# Patient Record
Sex: MALE | Race: BLACK OR AFRICAN AMERICAN | NOT HISPANIC OR LATINO
[De-identification: names, ages, dates, MRNs, and addresses within clinical notes are randomized per-mention and may not be internally consistent; named-entity substitution may affect disease eponyms.]

---

## 2017-01-16 ENCOUNTER — RESULT REVIEW (OUTPATIENT)
Age: 62
End: 2017-01-16

## 2017-01-16 ENCOUNTER — INPATIENT (INPATIENT)
Facility: HOSPITAL | Age: 62
LOS: 3 days | Discharge: HOME HEALTH | End: 2017-01-20
Attending: HOSPITALIST | Admitting: HOSPITALIST
Payer: MEDICAID

## 2017-01-16 VITALS
RESPIRATION RATE: 16 BRPM | TEMPERATURE: 98 F | HEIGHT: 68 IN | HEART RATE: 83 BPM | WEIGHT: 164.91 LBS | DIASTOLIC BLOOD PRESSURE: 88 MMHG | SYSTOLIC BLOOD PRESSURE: 133 MMHG | OXYGEN SATURATION: 100 %

## 2017-01-16 DIAGNOSIS — Z98.89 OTHER SPECIFIED POSTPROCEDURAL STATES: Chronic | ICD-10-CM

## 2017-01-16 DIAGNOSIS — Z90.49 ACQUIRED ABSENCE OF OTHER SPECIFIED PARTS OF DIGESTIVE TRACT: Chronic | ICD-10-CM

## 2017-01-16 DIAGNOSIS — Z90.89 ACQUIRED ABSENCE OF OTHER ORGANS: Chronic | ICD-10-CM

## 2017-01-16 NOTE — ED ADULT TRIAGE NOTE - CHIEF COMPLAINT QUOTE
Complaints of SOB, worse on excursion for about a week getting worse over past three days, pt states history of COPD, lung ca, with surgery in Oct. Saturday coughed up phlegm yesterday and today no coughing

## 2017-01-17 ENCOUNTER — RESULT REVIEW (OUTPATIENT)
Age: 62
End: 2017-01-17

## 2017-01-17 DIAGNOSIS — J90 PLEURAL EFFUSION, NOT ELSEWHERE CLASSIFIED: ICD-10-CM

## 2017-01-17 DIAGNOSIS — N40.0 BENIGN PROSTATIC HYPERPLASIA WITHOUT LOWER URINARY TRACT SYMPTOMS: ICD-10-CM

## 2017-01-17 DIAGNOSIS — Z41.8 ENCOUNTER FOR OTHER PROCEDURES FOR PURPOSES OTHER THAN REMEDYING HEALTH STATE: ICD-10-CM

## 2017-01-17 DIAGNOSIS — N18.3 CHRONIC KIDNEY DISEASE, STAGE 3 (MODERATE): ICD-10-CM

## 2017-01-17 DIAGNOSIS — I10 ESSENTIAL (PRIMARY) HYPERTENSION: ICD-10-CM

## 2017-01-17 DIAGNOSIS — C34.91 MALIGNANT NEOPLASM OF UNSPECIFIED PART OF RIGHT BRONCHUS OR LUNG: ICD-10-CM

## 2017-01-17 DIAGNOSIS — D64.9 ANEMIA, UNSPECIFIED: ICD-10-CM

## 2017-01-17 DIAGNOSIS — Z90.2 ACQUIRED ABSENCE OF LUNG [PART OF]: Chronic | ICD-10-CM

## 2017-01-17 LAB
ALBUMIN SERPL ELPH-MCNC: 3.6 G/DL — SIGNIFICANT CHANGE UP (ref 3.3–5)
ALP SERPL-CCNC: 88 U/L — SIGNIFICANT CHANGE UP (ref 40–120)
ALT FLD-CCNC: 16 U/L — SIGNIFICANT CHANGE UP (ref 4–41)
APTT BLD: 29.6 SEC — SIGNIFICANT CHANGE UP (ref 27.5–37.4)
AST SERPL-CCNC: 15 U/L — SIGNIFICANT CHANGE UP (ref 4–40)
BASE EXCESS BLDV CALC-SCNC: -0.3 MMOL/L — SIGNIFICANT CHANGE UP
BASOPHILS # BLD AUTO: 0.03 K/UL — SIGNIFICANT CHANGE UP (ref 0–0.2)
BASOPHILS NFR BLD AUTO: 0.3 % — SIGNIFICANT CHANGE UP (ref 0–2)
BILIRUB SERPL-MCNC: 0.6 MG/DL — SIGNIFICANT CHANGE UP (ref 0.2–1.2)
BLOOD GAS VENOUS - CREATININE: 2.25 MG/DL — HIGH (ref 0.5–1.3)
BUN SERPL-MCNC: 30 MG/DL — HIGH (ref 7–23)
CALCIUM SERPL-MCNC: 9.1 MG/DL — SIGNIFICANT CHANGE UP (ref 8.4–10.5)
CHLORIDE BLDV-SCNC: 102 MMOL/L — SIGNIFICANT CHANGE UP (ref 96–108)
CHLORIDE SERPL-SCNC: 97 MMOL/L — LOW (ref 98–107)
CO2 SERPL-SCNC: 21 MMOL/L — LOW (ref 22–31)
CREAT SERPL-MCNC: 2.08 MG/DL — HIGH (ref 0.5–1.3)
EOSINOPHIL # BLD AUTO: 0.08 K/UL — SIGNIFICANT CHANGE UP (ref 0–0.5)
EOSINOPHIL NFR BLD AUTO: 0.8 % — SIGNIFICANT CHANGE UP (ref 0–6)
GAS PNL BLDV: 134 MMOL/L — LOW (ref 136–146)
GLUCOSE BLDV-MCNC: 105 — HIGH (ref 70–99)
GLUCOSE SERPL-MCNC: 110 MG/DL — HIGH (ref 70–99)
HCO3 BLDV-SCNC: 23 MMOL/L — SIGNIFICANT CHANGE UP (ref 20–27)
HCT VFR BLD CALC: 34.7 % — LOW (ref 39–50)
HCT VFR BLDV CALC: 35.3 % — LOW (ref 39–51)
HGB BLD-MCNC: 11.2 G/DL — LOW (ref 13–17)
HGB BLDV-MCNC: 11.5 G/DL — LOW (ref 13–17)
IMM GRANULOCYTES NFR BLD AUTO: 0.2 % — SIGNIFICANT CHANGE UP (ref 0–1.5)
INR BLD: 1.17 — SIGNIFICANT CHANGE UP (ref 0.87–1.18)
LACTATE BLDV-MCNC: 1.3 MMOL/L — SIGNIFICANT CHANGE UP (ref 0.5–2)
LYMPHOCYTES # BLD AUTO: 2.07 K/UL — SIGNIFICANT CHANGE UP (ref 1–3.3)
LYMPHOCYTES # BLD AUTO: 20.8 % — SIGNIFICANT CHANGE UP (ref 13–44)
MAGNESIUM SERPL-MCNC: 2.4 MG/DL — SIGNIFICANT CHANGE UP (ref 1.6–2.6)
MCHC RBC-ENTMCNC: 29.3 PG — SIGNIFICANT CHANGE UP (ref 27–34)
MCHC RBC-ENTMCNC: 32.3 % — SIGNIFICANT CHANGE UP (ref 32–36)
MCV RBC AUTO: 90.8 FL — SIGNIFICANT CHANGE UP (ref 80–100)
MONOCYTES # BLD AUTO: 1.17 K/UL — HIGH (ref 0–0.9)
MONOCYTES NFR BLD AUTO: 11.8 % — SIGNIFICANT CHANGE UP (ref 2–14)
NEUTROPHILS # BLD AUTO: 6.56 K/UL — SIGNIFICANT CHANGE UP (ref 1.8–7.4)
NEUTROPHILS NFR BLD AUTO: 66.1 % — SIGNIFICANT CHANGE UP (ref 43–77)
PCO2 BLDV: 46 MMHG — SIGNIFICANT CHANGE UP (ref 41–51)
PH BLDV: 7.35 PH — SIGNIFICANT CHANGE UP (ref 7.32–7.43)
PHOSPHATE SERPL-MCNC: 3.8 MG/DL — SIGNIFICANT CHANGE UP (ref 2.5–4.5)
PLATELET # BLD AUTO: 320 K/UL — SIGNIFICANT CHANGE UP (ref 150–400)
PMV BLD: 10.6 FL — SIGNIFICANT CHANGE UP (ref 7–13)
PO2 BLDV: 31 MMHG — LOW (ref 35–40)
POTASSIUM BLDV-SCNC: 4.4 MMOL/L — SIGNIFICANT CHANGE UP (ref 3.4–4.5)
POTASSIUM SERPL-MCNC: 4.9 MMOL/L — SIGNIFICANT CHANGE UP (ref 3.5–5.3)
POTASSIUM SERPL-SCNC: 4.9 MMOL/L — SIGNIFICANT CHANGE UP (ref 3.5–5.3)
PROT SERPL-MCNC: 8 G/DL — SIGNIFICANT CHANGE UP (ref 6–8.3)
PROTHROM AB SERPL-ACNC: 13.4 SEC — HIGH (ref 10–13.1)
RBC # BLD: 3.82 M/UL — LOW (ref 4.2–5.8)
RBC # FLD: 13.3 % — SIGNIFICANT CHANGE UP (ref 10.3–14.5)
SAO2 % BLDV: 48 % — LOW (ref 60–85)
SODIUM SERPL-SCNC: 136 MMOL/L — SIGNIFICANT CHANGE UP (ref 135–145)
WBC # BLD: 9.93 K/UL — SIGNIFICANT CHANGE UP (ref 3.8–10.5)
WBC # FLD AUTO: 9.93 K/UL — SIGNIFICANT CHANGE UP (ref 3.8–10.5)

## 2017-01-17 PROCEDURE — 99223 1ST HOSP IP/OBS HIGH 75: CPT | Mod: GC

## 2017-01-17 PROCEDURE — 88363 XM ARCHIVE TISSUE MOLEC ANAL: CPT

## 2017-01-17 PROCEDURE — 71250 CT THORAX DX C-: CPT | Mod: 26

## 2017-01-17 PROCEDURE — 71020: CPT | Mod: 26

## 2017-01-17 RX ORDER — OXYCODONE HYDROCHLORIDE 5 MG/1
5 TABLET ORAL ONCE
Qty: 0 | Refills: 0 | Status: DISCONTINUED | OUTPATIENT
Start: 2017-01-17 | End: 2017-01-17

## 2017-01-17 RX ORDER — IPRATROPIUM/ALBUTEROL SULFATE 18-103MCG
3 AEROSOL WITH ADAPTER (GRAM) INHALATION ONCE
Qty: 0 | Refills: 0 | Status: COMPLETED | OUTPATIENT
Start: 2017-01-17 | End: 2017-01-17

## 2017-01-17 RX ORDER — AMLODIPINE BESYLATE 2.5 MG/1
5 TABLET ORAL DAILY
Qty: 0 | Refills: 0 | Status: DISCONTINUED | OUTPATIENT
Start: 2017-01-17 | End: 2017-01-20

## 2017-01-17 RX ORDER — TAMSULOSIN HYDROCHLORIDE 0.4 MG/1
0.4 CAPSULE ORAL AT BEDTIME
Qty: 0 | Refills: 0 | Status: DISCONTINUED | OUTPATIENT
Start: 2017-01-17 | End: 2017-01-20

## 2017-01-17 RX ORDER — FINASTERIDE 5 MG/1
5 TABLET, FILM COATED ORAL DAILY
Qty: 0 | Refills: 0 | Status: DISCONTINUED | OUTPATIENT
Start: 2017-01-17 | End: 2017-01-20

## 2017-01-17 RX ORDER — FOLIC ACID 0.8 MG
1 TABLET ORAL DAILY
Qty: 0 | Refills: 0 | Status: DISCONTINUED | OUTPATIENT
Start: 2017-01-17 | End: 2017-01-20

## 2017-01-17 RX ORDER — IPRATROPIUM/ALBUTEROL SULFATE 18-103MCG
3 AEROSOL WITH ADAPTER (GRAM) INHALATION EVERY 6 HOURS
Qty: 0 | Refills: 0 | Status: DISCONTINUED | OUTPATIENT
Start: 2017-01-17 | End: 2017-01-20

## 2017-01-17 RX ORDER — NICOTINE POLACRILEX 2 MG
1 GUM BUCCAL DAILY
Qty: 0 | Refills: 0 | Status: DISCONTINUED | OUTPATIENT
Start: 2017-01-17 | End: 2017-01-20

## 2017-01-17 RX ADMIN — Medication 1 MILLIGRAM(S): at 13:03

## 2017-01-17 RX ADMIN — OXYCODONE HYDROCHLORIDE 5 MILLIGRAM(S): 5 TABLET ORAL at 22:14

## 2017-01-17 RX ADMIN — Medication 3 MILLILITER(S): at 01:39

## 2017-01-17 RX ADMIN — AMLODIPINE BESYLATE 5 MILLIGRAM(S): 2.5 TABLET ORAL at 15:25

## 2017-01-17 RX ADMIN — OXYCODONE HYDROCHLORIDE 5 MILLIGRAM(S): 5 TABLET ORAL at 23:14

## 2017-01-17 RX ADMIN — Medication 1 PATCH: at 15:25

## 2017-01-17 RX ADMIN — TAMSULOSIN HYDROCHLORIDE 0.4 MILLIGRAM(S): 0.4 CAPSULE ORAL at 22:14

## 2017-01-17 RX ADMIN — FINASTERIDE 5 MILLIGRAM(S): 5 TABLET, FILM COATED ORAL at 13:03

## 2017-01-17 NOTE — H&P ADULT. - HISTORY OF PRESENT ILLNESS
62 Y/O male with positive finding on routine chest x ray at annual physical Jan. 2016.  Biopsy May 2016 confirmed malignancy.  Completed course of  chemo ( June2016- September 2016).  Scheduled Right VATS with possible thoracotomy, lobectomy on 10/25/2016  Post op course uneventful except for elevated Creatine. Renal consult, Dr Hartley saw pt and recommended Increase IVF for 10hrs and a renal sono. On POD # 2 renal sono done and was found to be normal and serum Creatine trending toward baseline 61M h/o HTN, CKD3, BPH, lung adenocarcinoma (dx 5/2016) s/p chemotherapy (5-9/2016) and R VATS and R apical lobectomy (10/25/16) presents with acute dyspnea x 3 days in the context of progressive dyspnea for about 3 weeks. Pt came to the ED because his shortness of breath became unbearable over the last 3 days. The SOB is accompanied by chest pain radiating to the back on the right side worse with movement, and new dry cough for 1 week. Pt denies any fever, hemoptysis, weight loss, changes in appetite, fatigue, or malaise.     Pt says that he never really felt short of breath before 3 weeks ago and could walk about a mile in early December. He said that his lung cancer presented with an incidental nodule on chest x ray 1/2016 and was never really symptomatic. Biopsy May 2016 confirmed malignancy. He received underwent chemotherapy and VATS and had no treatment since the procedure as he had a lapse in his insurance. He is set to resume chemotherapy with his oncologist Dr. Lee next month along with radiation. 61M h/o HTN, CKD3, BPH, lung adenocarcinoma (dx 5/2016) s/p chemotherapy (5-9/2016) and R VATS and R apical lobectomy (10/25/16) presents with acute dyspnea x 3 days in the context of progressive dyspnea for about 3 weeks. Pt came to the ED because his shortness of breath became unbearable over the last 3 days. The SOB is accompanied by a new dry cough for 1 week. Pt has R sided chest wall pain present since the VATS procedure, worse when pending down, no orthopnea. Pt denies any fever, hemoptysis, weight loss, changes in appetite, fatigue, or malaise.     Pt says that he never really felt short of breath before 3 weeks ago and could walk about a mile in early December. He said that his lung cancer presented with an incidental nodule on chest x ray 1/2016 and was never really symptomatic. Biopsy May 2016 confirmed malignancy. He received underwent chemotherapy and VATS and had no treatment since the procedure as he had a lapse in his insurance. He is set to resume chemotherapy with his oncologist Dr. Lee next month along with radiation.    In the ED pt's vital signs were T 97.8, HR 83, /88, RR 18, 100% on room air. He received duoneb x1. CXR revealed a large right pleural effusion. 61M h/o HTN, CKD3, BPH, lung adenocarcinoma (dx 5/2016) s/p chemotherapy (5-9/2016) and R VATS and R apical lobectomy (10/25/16) presents with acute dyspnea x 3 days in the context of progressive dyspnea for about 3 weeks. Pt came to the ED because his shortness of breath became unbearable over the last 3 days. The SOB is accompanied by a new dry cough for 1 week. Pt has R sided chest wall pain present since the VATS procedure, worse when pending down, no orthopnea. Pt denies any fever, hemoptysis, weight loss, changes in appetite, fatigue, or malaise.     Pt says that he never really felt short of breath before 3 weeks ago and could walk about a mile in early December. He said that his lung cancer presented with an incidental nodule on chest x ray 1/2016 and was never really symptomatic. Biopsy May 2016 confirmed malignancy. He received underwent chemotherapy and VATS and had no treatment since the procedure as he had a lapse in his insurance. He is set to resume chemotherapy with his oncologist Dr. Lee next month along with radiation.    In the ED pt's vital signs were T 97.8, HR 83, /88, RR 18, 100% on room air. Labs showed baseline normocytic anemia to Hgb 11.2 and baseline Cr 2.08. CXR revealed a large right pleural effusion. CT chest showed a large loculated right pleural effusion with near complete atelectasis of the right middle and lower lobes. He received duoneb x1.

## 2017-01-17 NOTE — ED PROVIDER NOTE - DIMINISHED BREATH SOUNDS
LEFT UPPER LOBE/decreased air entry of the left lung with dullness to percussion/LEFT LOWER LOBE RIGHT LOWER LOBE/RIGHT MIDDLE LOBE/decreased air entry of the left lung with dullness to percussion/RIGHT UPPER LOBE RIGHT MIDDLE LOBE/decreased air entry of the right lung with dullness to percussion/RIGHT UPPER LOBE/RIGHT LOWER LOBE

## 2017-01-17 NOTE — ED ADULT NURSE NOTE - OBJECTIVE STATEMENT
pt alert and oriented, pt states hx of lung CA, and dx with COPD. pt states about a week ago he began to feel SOB with any exertion including standing up. pt states a few days ago it began to get even worse. pt denies pain.

## 2017-01-17 NOTE — H&P ADULT. - ASSESSMENT
61M h/o HTN, CKD3, BPH, lung adenocarcinoma (dx 5/2016) s/p chemotherapy (5-9/2016) and R VATS and R apical lobectomy (10/25/16) presents with acute dyspnea x 3 days in the context of progressive dyspnea for about 3 weeks. Pt with normal vitals, clinically stable with baseline labs but CT chest showing large loculated right pleural effusion. Concern for malignant effusion in context of known lung cancer.

## 2017-01-17 NOTE — H&P ADULT. - NEGATIVE GASTROINTESTINAL SYMPTOMS
no vomiting/no melena/no diarrhea/no constipation/no hematochezia/no change in bowel habits/no abdominal pain/no nausea

## 2017-01-17 NOTE — H&P ADULT. - PROBLEM SELECTOR PLAN 4
Likely anemia of chronic disease as Hgb at baseline, but will monitor for bleeding and hemoptysis  - Iron studies  - Trend Hgb

## 2017-01-17 NOTE — H&P ADULT. - PSH
H/O eye surgery  left eye 1992  H/O hernia repair  2006  History of appendectomy  1976  History of lung biopsy  (R) 5/2016  History of tonsillectomy  1975  S/P lobectomy of lung
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
## 2017-01-17 NOTE — H&P ADULT. - ATTENDING COMMENTS
Pt was seen and examined on date of admission, 1/17/17 with housestaff.  Case d/w Dr. Whiting and agree with findings and plan as detailed above.

## 2017-01-17 NOTE — H&P ADULT. - LYMPHATIC
axillary L/axillary R/anterior cervical L/supraclavicular R/posterior cervical L/anterior cervical R/supraclavicular L/posterior cervical R

## 2017-01-17 NOTE — ED PROVIDER NOTE - CHPI ED SYMPTOMS POS
DIFFICULTY BREATHING/COUGH/SHORTNESS OF BREATH/resolved coughing (Saturday), b/l foot edema, and "right sided pain." and has worsening (progressively), constant SOB

## 2017-01-17 NOTE — ED PROVIDER NOTE - OBJECTIVE STATEMENT
62 y/o M pt with PMHx of Lung Cancer, blind, BPH, and HTN and PSHx of tumor removal ("right side"), and lung biopsy, arrives to the ED c/o progressively worsening (last 3 days) constant SOB onset x1 week. Relief with "calming down." Dx with lunger cancer in May 2016, and had Chemotherapy done for 3 months; also had a tumor removal. Pt is supposed to have Chemotherapy and radiation treatment in February.  Also c/o resolved coughing (Saturday), b/l foot edema, and "right sided pain." Denies fever, chills, or any other complaints. NKDA. 60 y/o M pt with PMHx of Lung Cancer (diagnosed in May 2016, s/p VATS with right lobectomy in October 2016), blind in left eye, current smoker,  BPH, and HTN and PSHx of tumor removal ("right side"), and lung biopsy, arrives to the ED c/o progressively worsening (last 3 days) constant SOB onset x1 week. Relief with "calming down." Dx with lunger cancer in May 2016, and had Chemotherapy done for 3 months; also had a tumor removal. Pt is supposed to have Chemotherapy and radiation treatment in February.  Also c/o resolved coughing (Saturday), b/l foot edema, and "right sided pain." Denies fever, chills, or any other complaints. NKDA. 60 y/o M pt with PMHx of Lung Cancer (diagnosed in May 2016, s/p VATS with right lobectomy in October 2016), blind in left eye, current smoker,  BPH, and HTN and PSHx of tumor removal ("right side"), and lung biopsy, arrives to the ED c/o progressively worsening (last 3 days) constant SOB onset x1 week. Relief with "calming down." Dx with lunger cancer in May 2016, and had Chemotherapy done for 3 months; also had a tumor removal. Pt is supposed to have Chemotherapy and radiation treatment in February.  Also c/o resolved coughing (Saturday), b/l foot edema, and "right sided pain." Denies fever, chills, or any other complaints. NKDA.  Of note, pt has 47 year pack history and quit smoking initially in October but resumed again on NYE and has been smoking 1 PPD since then. 62 y/o M pt with PMHx of Lung Cancer (diagnosed in May 2016, s/p VATS with right lobectomy in October 2016), blind in left eye, current smoker,  BPH, and HTN and PSHx of tumor removal ("right side"), and lung biopsy, arrives to the ED c/o progressively worsening (last 3 days) GOINS onset x 1 week. Relief with rest. No CP. Dx with lung cancer in May 2016, and had Chemotherapy done for 3 months; also had a tumor removal. Pt is supposed to have Chemotherapy and radiation treatment in February.  Also c/o resolved coughing (Saturday), b/l foot edema, and "right sided pain." Denies fever, chills, or any other complaints. NKDA.  Of note, pt has 47 year pack history and quit smoking initially in October but resumed again on NYE and has been smoking 1 PPD since then.  Chemo has been delayed d/t insurance gap. Now being re-established.

## 2017-01-17 NOTE — H&P ADULT. - PROBLEM SELECTOR PLAN 1
Large pleural effusions on imaging, but pt currently has normal vitals without hypoxia  - Concern for malignant effusion given known lung cancer  - Will call CT surgery for thoracentesis  - L lung currently compensating, will monitor vitals and O2 sat  - Stephan MATUTEN

## 2017-01-17 NOTE — ED PROVIDER NOTE - NS ED MD SCRIBE ATTENDING SCRIBE SECTIONS
HISTORY OF PRESENT ILLNESS/DISPOSITION/VITAL SIGNS( Pullset)/REVIEW OF SYSTEMS/HIV/PHYSICAL EXAM/PAST MEDICAL/SURGICAL/SOCIAL HISTORY

## 2017-01-17 NOTE — H&P ADULT. - RS GEN PE MLT RESP DETAILS PC
good air movement/diminished breath sounds, R/chest wall tenderness on R/no wheezes/no rales/chest wall tenderness/no rhonchi/respirations non-labored chest wall tenderness/no wheezes/no rales/no rhonchi/diminished breath sounds, R/decreased breath sound at right base/good air movement/respirations non-labored

## 2017-01-17 NOTE — H&P ADULT. - FAMILY HISTORY
<<-----Click on this checkbox to enter Family History Family history of heart disease, PPM, CHF     Father  Still living? Unknown  Family history of heart disease, Age at diagnosis: Age Unknown

## 2017-01-17 NOTE — ED PROVIDER NOTE - CARE PLAN
Principal Discharge DX:	Pleural effusion, right  Secondary Diagnosis:	Malignant neoplasm of lung, unspecified laterality, unspecified part of lung

## 2017-01-17 NOTE — H&P ADULT. - PROBLEM SELECTOR PLAN 3
Currently stable, at baseline  - Renal ultrasound to rule out obstruction as pt has urinary hesitancy  - Avoid nephrotoxic agents, trend Cr  - Renal following

## 2017-01-17 NOTE — ED PROVIDER NOTE - CONSTITUTIONAL, MLM
normal... Well appearing, well nourished, awake, alert, oriented to person, place, time/situation and in no apparent distress. Well appearing, well nourished, awake, alert, oriented to person, place, time/situation and in no apparent distress. Breathign comfortably at rest, speaking in full sentences.

## 2017-01-17 NOTE — H&P ADULT. - RADIOLOGY RESULTS AND INTERPRETATION
CT chest: Large loculated right pleural effusion with near complete atelectasis of   the right middle and lower lobes. Status post right upper lobectomy.

## 2017-01-17 NOTE — ED PROVIDER NOTE - PSH
H/O eye surgery  left eye 1992  H/O hernia repair  2006  History of appendectomy  1976  History of lung biopsy  (R) 5/2016  History of tonsillectomy  1975

## 2017-01-17 NOTE — ED PROVIDER NOTE - MEDICAL DECISION MAKING DETAILS
62 y/o M pt (with Lung cancer) presents with worsening SOB. consider plural effusion vs worsening cancer vs PE. labs, chest XR, nebs., and reassess

## 2017-01-17 NOTE — ED PROVIDER NOTE - ATTENDING CONTRIBUTION TO CARE
Attending Attestation: Dr. Kaplan  I have personally performed a history and physical examination of the patient and discussed management with the resident as well as the patient.  I reviewed the resident's note and agree with the documented findings and plan of care.  I have authored and modified critical sections of the Provider Note, including but not limited to HPI, Physical Exam and MDM. 60 y/o M pt (with Lung cancer) presents with worsening SOB. consider plural effusion vs worsening cancer vs PE. labs, chest XR, nebs., and reassess    This scribe's documentation has been prepared under my direction and personally reviewed by me in its entirety. I confirm that the note above accurately reflects all work, treatment, procedures, and medical decision making performed by me.

## 2017-01-18 LAB
ALBUMIN SERPL ELPH-MCNC: 3.4 G/DL — SIGNIFICANT CHANGE UP (ref 3.3–5)
ALP SERPL-CCNC: 91 U/L — SIGNIFICANT CHANGE UP (ref 40–120)
ALT FLD-CCNC: 9 U/L — SIGNIFICANT CHANGE UP (ref 4–41)
APTT BLD: 28.4 SEC — SIGNIFICANT CHANGE UP (ref 27.5–37.4)
AST SERPL-CCNC: 26 U/L — SIGNIFICANT CHANGE UP (ref 4–40)
BILIRUB DIRECT SERPL-MCNC: 0.1 MG/DL — SIGNIFICANT CHANGE UP (ref 0.1–0.2)
BILIRUB SERPL-MCNC: 0.4 MG/DL — SIGNIFICANT CHANGE UP (ref 0.2–1.2)
BUN SERPL-MCNC: 27 MG/DL — HIGH (ref 7–23)
CALCIUM SERPL-MCNC: 8.9 MG/DL — SIGNIFICANT CHANGE UP (ref 8.4–10.5)
CHLORIDE SERPL-SCNC: 98 MMOL/L — SIGNIFICANT CHANGE UP (ref 98–107)
CO2 SERPL-SCNC: 18 MMOL/L — LOW (ref 22–31)
CREAT SERPL-MCNC: 1.82 MG/DL — HIGH (ref 0.5–1.3)
FERRITIN SERPL-MCNC: 580.3 NG/ML — HIGH (ref 30–400)
GLUCOSE FLD-MCNC: 14 MG/DL — SIGNIFICANT CHANGE UP
GLUCOSE SERPL-MCNC: 91 MG/DL — SIGNIFICANT CHANGE UP (ref 70–99)
GRAM STN FLD: SIGNIFICANT CHANGE UP
HCT VFR BLD CALC: 33.6 % — LOW (ref 39–50)
HGB BLD-MCNC: 11 G/DL — LOW (ref 13–17)
INR BLD: 1.04 — SIGNIFICANT CHANGE UP (ref 0.87–1.18)
IRON SATN MFR SERPL: 217 UG/DL — SIGNIFICANT CHANGE UP (ref 155–535)
IRON SATN MFR SERPL: 25 UG/DL — LOW (ref 45–165)
LDH SERPL L TO P-CCNC: 496 U/L — SIGNIFICANT CHANGE UP
LDH SERPL L TO P-CCNC: 546 U/L — HIGH (ref 135–225)
MAGNESIUM SERPL-MCNC: 2.4 MG/DL — SIGNIFICANT CHANGE UP (ref 1.6–2.6)
MCHC RBC-ENTMCNC: 29.1 PG — SIGNIFICANT CHANGE UP (ref 27–34)
MCHC RBC-ENTMCNC: 32.7 % — SIGNIFICANT CHANGE UP (ref 32–36)
MCV RBC AUTO: 88.9 FL — SIGNIFICANT CHANGE UP (ref 80–100)
PHOSPHATE SERPL-MCNC: 3.5 MG/DL — SIGNIFICANT CHANGE UP (ref 2.5–4.5)
PLATELET # BLD AUTO: 323 K/UL — SIGNIFICANT CHANGE UP (ref 150–400)
PMV BLD: 10.6 FL — SIGNIFICANT CHANGE UP (ref 7–13)
POTASSIUM SERPL-MCNC: 4.5 MMOL/L — SIGNIFICANT CHANGE UP (ref 3.5–5.3)
POTASSIUM SERPL-SCNC: 4.5 MMOL/L — SIGNIFICANT CHANGE UP (ref 3.5–5.3)
PROT FLD-MCNC: 5.7 G/DL — SIGNIFICANT CHANGE UP
PROT SERPL-MCNC: 7.9 G/DL — SIGNIFICANT CHANGE UP (ref 6–8.3)
PROTHROM AB SERPL-ACNC: 11.9 SEC — SIGNIFICANT CHANGE UP (ref 10–13.1)
RBC # BLD: 3.78 M/UL — LOW (ref 4.2–5.8)
RBC # FLD: 13.6 % — SIGNIFICANT CHANGE UP (ref 10.3–14.5)
SODIUM SERPL-SCNC: 133 MMOL/L — LOW (ref 135–145)
SPECIMEN SOURCE: SIGNIFICANT CHANGE UP
UIBC SERPL-MCNC: 192 UG/DL — SIGNIFICANT CHANGE UP (ref 110–370)
WBC # BLD: 9.18 K/UL — SIGNIFICANT CHANGE UP (ref 3.8–10.5)
WBC # FLD AUTO: 9.18 K/UL — SIGNIFICANT CHANGE UP (ref 3.8–10.5)

## 2017-01-18 PROCEDURE — 99233 SBSQ HOSP IP/OBS HIGH 50: CPT | Mod: GC

## 2017-01-18 PROCEDURE — 71010: CPT | Mod: 26

## 2017-01-18 PROCEDURE — 88305 TISSUE EXAM BY PATHOLOGIST: CPT | Mod: 26

## 2017-01-18 PROCEDURE — 32551 INSERTION OF CHEST TUBE: CPT | Mod: 78

## 2017-01-18 PROCEDURE — 88112 CYTOPATH CELL ENHANCE TECH: CPT | Mod: 26

## 2017-01-18 RX ORDER — SENNA PLUS 8.6 MG/1
2 TABLET ORAL AT BEDTIME
Qty: 0 | Refills: 0 | Status: DISCONTINUED | OUTPATIENT
Start: 2017-01-18 | End: 2017-01-20

## 2017-01-18 RX ORDER — LIDOCAINE HCL 20 MG/ML
20 VIAL (ML) INJECTION ONCE
Qty: 0 | Refills: 0 | Status: DISCONTINUED | OUTPATIENT
Start: 2017-01-18 | End: 2017-01-20

## 2017-01-18 RX ORDER — ACETAMINOPHEN 500 MG
650 TABLET ORAL EVERY 6 HOURS
Qty: 0 | Refills: 0 | Status: DISCONTINUED | OUTPATIENT
Start: 2017-01-18 | End: 2017-01-20

## 2017-01-18 RX ORDER — DOCUSATE SODIUM 100 MG
100 CAPSULE ORAL THREE TIMES A DAY
Qty: 0 | Refills: 0 | Status: DISCONTINUED | OUTPATIENT
Start: 2017-01-18 | End: 2017-01-20

## 2017-01-18 RX ADMIN — Medication 1 MILLIGRAM(S): at 11:19

## 2017-01-18 RX ADMIN — TAMSULOSIN HYDROCHLORIDE 0.4 MILLIGRAM(S): 0.4 CAPSULE ORAL at 21:45

## 2017-01-18 RX ADMIN — Medication 650 MILLIGRAM(S): at 11:19

## 2017-01-18 RX ADMIN — Medication 100 MILLIGRAM(S): at 22:18

## 2017-01-18 RX ADMIN — AMLODIPINE BESYLATE 5 MILLIGRAM(S): 2.5 TABLET ORAL at 05:43

## 2017-01-18 RX ADMIN — FINASTERIDE 5 MILLIGRAM(S): 5 TABLET, FILM COATED ORAL at 11:19

## 2017-01-18 RX ADMIN — Medication 650 MILLIGRAM(S): at 11:49

## 2017-01-18 RX ADMIN — SENNA PLUS 2 TABLET(S): 8.6 TABLET ORAL at 22:18

## 2017-01-18 RX ADMIN — Medication 1 PATCH: at 11:19

## 2017-01-18 RX ADMIN — Medication 650 MILLIGRAM(S): at 22:15

## 2017-01-18 RX ADMIN — Medication 650 MILLIGRAM(S): at 21:45

## 2017-01-18 RX ADMIN — Medication 1 PATCH: at 11:20

## 2017-01-19 ENCOUNTER — RESULT REVIEW (OUTPATIENT)
Age: 62
End: 2017-01-19

## 2017-01-19 LAB
BUN SERPL-MCNC: 30 MG/DL — HIGH (ref 7–23)
CALCIUM SERPL-MCNC: 9.2 MG/DL — SIGNIFICANT CHANGE UP (ref 8.4–10.5)
CHLORIDE SERPL-SCNC: 98 MMOL/L — SIGNIFICANT CHANGE UP (ref 98–107)
CO2 SERPL-SCNC: 20 MMOL/L — LOW (ref 22–31)
CREAT SERPL-MCNC: 1.97 MG/DL — HIGH (ref 0.5–1.3)
GLUCOSE SERPL-MCNC: 108 MG/DL — HIGH (ref 70–99)
HCT VFR BLD CALC: 32.3 % — LOW (ref 39–50)
HGB BLD-MCNC: 10.7 G/DL — LOW (ref 13–17)
MAGNESIUM SERPL-MCNC: 2.4 MG/DL — SIGNIFICANT CHANGE UP (ref 1.6–2.6)
MCHC RBC-ENTMCNC: 29.2 PG — SIGNIFICANT CHANGE UP (ref 27–34)
MCHC RBC-ENTMCNC: 33.1 % — SIGNIFICANT CHANGE UP (ref 32–36)
MCV RBC AUTO: 88 FL — SIGNIFICANT CHANGE UP (ref 80–100)
NON-GYNECOLOGICAL CYTOLOGY STUDY: SIGNIFICANT CHANGE UP
PLATELET # BLD AUTO: 372 K/UL — SIGNIFICANT CHANGE UP (ref 150–400)
PMV BLD: 10 FL — SIGNIFICANT CHANGE UP (ref 7–13)
POTASSIUM SERPL-MCNC: 4.7 MMOL/L — SIGNIFICANT CHANGE UP (ref 3.5–5.3)
POTASSIUM SERPL-SCNC: 4.7 MMOL/L — SIGNIFICANT CHANGE UP (ref 3.5–5.3)
RBC # BLD: 3.67 M/UL — LOW (ref 4.2–5.8)
RBC # FLD: 13.3 % — SIGNIFICANT CHANGE UP (ref 10.3–14.5)
SODIUM SERPL-SCNC: 133 MMOL/L — LOW (ref 135–145)
WBC # BLD: 10.4 K/UL — SIGNIFICANT CHANGE UP (ref 3.8–10.5)
WBC # FLD AUTO: 10.4 K/UL — SIGNIFICANT CHANGE UP (ref 3.8–10.5)

## 2017-01-19 PROCEDURE — 71010: CPT | Mod: 26

## 2017-01-19 PROCEDURE — 99233 SBSQ HOSP IP/OBS HIGH 50: CPT | Mod: GC

## 2017-01-19 PROCEDURE — 71250 CT THORAX DX C-: CPT | Mod: 26

## 2017-01-19 RX ADMIN — Medication 1 PATCH: at 11:55

## 2017-01-19 RX ADMIN — AMLODIPINE BESYLATE 5 MILLIGRAM(S): 2.5 TABLET ORAL at 11:53

## 2017-01-19 RX ADMIN — Medication 100 MILLIGRAM(S): at 06:18

## 2017-01-19 RX ADMIN — SENNA PLUS 2 TABLET(S): 8.6 TABLET ORAL at 21:58

## 2017-01-19 RX ADMIN — Medication 1 MILLIGRAM(S): at 11:53

## 2017-01-19 RX ADMIN — Medication 100 MILLIGRAM(S): at 11:52

## 2017-01-19 RX ADMIN — TAMSULOSIN HYDROCHLORIDE 0.4 MILLIGRAM(S): 0.4 CAPSULE ORAL at 21:58

## 2017-01-19 RX ADMIN — Medication 1 PATCH: at 11:52

## 2017-01-19 RX ADMIN — FINASTERIDE 5 MILLIGRAM(S): 5 TABLET, FILM COATED ORAL at 11:53

## 2017-01-19 RX ADMIN — Medication 100 MILLIGRAM(S): at 21:58

## 2017-01-20 ENCOUNTER — TRANSCRIPTION ENCOUNTER (OUTPATIENT)
Age: 62
End: 2017-01-20

## 2017-01-20 ENCOUNTER — APPOINTMENT (OUTPATIENT)
Dept: THORACIC SURGERY | Facility: HOSPITAL | Age: 62
End: 2017-01-20

## 2017-01-20 VITALS
SYSTOLIC BLOOD PRESSURE: 105 MMHG | RESPIRATION RATE: 18 BRPM | DIASTOLIC BLOOD PRESSURE: 76 MMHG | TEMPERATURE: 98 F | HEART RATE: 66 BPM

## 2017-01-20 LAB
APTT BLD: 30.4 SEC — SIGNIFICANT CHANGE UP (ref 27.5–37.4)
BLD GP AB SCN SERPL QL: NEGATIVE — SIGNIFICANT CHANGE UP
BUN SERPL-MCNC: 28 MG/DL — HIGH (ref 7–23)
CALCIUM SERPL-MCNC: 9.4 MG/DL — SIGNIFICANT CHANGE UP (ref 8.4–10.5)
CHLORIDE SERPL-SCNC: 94 MMOL/L — LOW (ref 98–107)
CO2 SERPL-SCNC: 24 MMOL/L — SIGNIFICANT CHANGE UP (ref 22–31)
CREAT SERPL-MCNC: 2.03 MG/DL — HIGH (ref 0.5–1.3)
GLUCOSE SERPL-MCNC: 84 MG/DL — SIGNIFICANT CHANGE UP (ref 70–99)
HCT VFR BLD CALC: 33.4 % — LOW (ref 39–50)
HGB BLD-MCNC: 11.1 G/DL — LOW (ref 13–17)
INR BLD: 1.09 — SIGNIFICANT CHANGE UP (ref 0.87–1.18)
MCHC RBC-ENTMCNC: 29.6 PG — SIGNIFICANT CHANGE UP (ref 27–34)
MCHC RBC-ENTMCNC: 33.2 % — SIGNIFICANT CHANGE UP (ref 32–36)
MCV RBC AUTO: 89.1 FL — SIGNIFICANT CHANGE UP (ref 80–100)
PH FLD: 8 PH — SIGNIFICANT CHANGE UP
PLATELET # BLD AUTO: 408 K/UL — HIGH (ref 150–400)
PMV BLD: 10 FL — SIGNIFICANT CHANGE UP (ref 7–13)
POTASSIUM SERPL-MCNC: 4.7 MMOL/L — SIGNIFICANT CHANGE UP (ref 3.5–5.3)
POTASSIUM SERPL-SCNC: 4.7 MMOL/L — SIGNIFICANT CHANGE UP (ref 3.5–5.3)
PROTHROM AB SERPL-ACNC: 12.4 SEC — SIGNIFICANT CHANGE UP (ref 10–13.1)
RBC # BLD: 3.75 M/UL — LOW (ref 4.2–5.8)
RBC # FLD: 13.5 % — SIGNIFICANT CHANGE UP (ref 10.3–14.5)
RH IG SCN BLD-IMP: POSITIVE — SIGNIFICANT CHANGE UP
SODIUM SERPL-SCNC: 133 MMOL/L — LOW (ref 135–145)
WBC # BLD: 8.9 K/UL — SIGNIFICANT CHANGE UP (ref 3.8–10.5)
WBC # FLD AUTO: 8.9 K/UL — SIGNIFICANT CHANGE UP (ref 3.8–10.5)

## 2017-01-20 PROCEDURE — 88305 TISSUE EXAM BY PATHOLOGIST: CPT | Mod: 26

## 2017-01-20 PROCEDURE — 71010: CPT | Mod: 26,76

## 2017-01-20 PROCEDURE — 99239 HOSP IP/OBS DSCHRG MGMT >30: CPT

## 2017-01-20 RX ORDER — SODIUM CHLORIDE 9 MG/ML
1000 INJECTION INTRAMUSCULAR; INTRAVENOUS; SUBCUTANEOUS
Qty: 0 | Refills: 0 | Status: DISCONTINUED | OUTPATIENT
Start: 2017-01-20 | End: 2017-01-20

## 2017-01-20 RX ORDER — HEPARIN SODIUM 5000 [USP'U]/ML
5000 INJECTION INTRAVENOUS; SUBCUTANEOUS EVERY 12 HOURS
Qty: 0 | Refills: 0 | Status: DISCONTINUED | OUTPATIENT
Start: 2017-01-20 | End: 2017-01-20

## 2017-01-20 RX ORDER — NICOTINE POLACRILEX 2 MG
1 GUM BUCCAL
Qty: 1 | Refills: 2 | OUTPATIENT
Start: 2017-01-20 | End: 2017-04-19

## 2017-01-20 RX ORDER — OXYCODONE HYDROCHLORIDE 5 MG/1
1 TABLET ORAL
Qty: 84 | Refills: 0 | OUTPATIENT
Start: 2017-01-20 | End: 2017-02-10

## 2017-01-20 RX ADMIN — Medication 1 PATCH: at 17:00

## 2017-01-20 RX ADMIN — AMLODIPINE BESYLATE 5 MILLIGRAM(S): 2.5 TABLET ORAL at 05:52

## 2017-01-20 RX ADMIN — SODIUM CHLORIDE 30 MILLILITER(S): 9 INJECTION INTRAMUSCULAR; INTRAVENOUS; SUBCUTANEOUS at 14:00

## 2017-01-20 RX ADMIN — Medication 100 MILLIGRAM(S): at 13:59

## 2017-01-20 RX ADMIN — Medication 100 MILLIGRAM(S): at 05:52

## 2017-01-20 NOTE — DISCHARGE NOTE ADULT - PLAN OF CARE
Resolution The progressive shortness of breath that brought you to the hospital was caused by a fluid in the lung caused by your lung cancer. You had a chest tube called a PleurX catheter placed to drain your pleural effusion (lung fluid). The visiting nurse will come 3 times weekly to drain the catheter. Please call the number below on the next business day to schedule an appointment in 2 weeks with your cardiothoracic surgeon, Dr. De La Torre. Please see your primary care doctor within 1 week and have him send you to get a PA and Lateral Chest X Ray 2 days before the appointment to that you can bring it to your appointment with Dr. De La Torre. Please follow up with your oncologist Dr. Lee for treatment of your lung cancer with radiation and chemotherapy Please follow up with your nephrologist or primary care doctor. Please continue taking finasteride and flomax and follow up with your primary care doctor. Please continue taking amlodipine and follow up with your primary care doctor.

## 2017-01-20 NOTE — DISCHARGE NOTE ADULT - PATIENT PORTAL LINK FT
“You can access the [facility] Patient Portal, offered by [facility], by registering with the following website: [email or web address]”

## 2017-01-20 NOTE — DISCHARGE NOTE ADULT - SECONDARY DIAGNOSIS.
Adenocarcinoma of right lung CKD (chronic kidney disease), stage 3 (moderate) BPH (benign prostatic hyperplasia) HTN (hypertension)

## 2017-01-20 NOTE — DISCHARGE NOTE ADULT - CARE PLAN
Principal Discharge DX:	Malignant pleural effusion  Goal:	Resolution  Instructions for follow-up, activity and diet:	The progressive shortness of breath that brought you to the hospital was caused by a fluid in the lung caused by your lung cancer. You had a chest tube called a PleurX catheter placed to drain your pleural effusion (lung fluid). The visiting nurse will come 3 times weekly to drain the catheter. Please call the number below on the next business day to schedule an appointment in 2 weeks with your cardiothoracic surgeon, Dr. De La Torre. Please see your primary care doctor within 1 week and have him send you to get a PA and Lateral Chest X Ray 2 days before the appointment to that you can bring it to your appointment with Dr. De La Torre.  Secondary Diagnosis:	Adenocarcinoma of right lung  Instructions for follow-up, activity and diet:	Please follow up with your oncologist Dr. Lee for treatment of your lung cancer with radiation and chemotherapy  Secondary Diagnosis:	CKD (chronic kidney disease), stage 3 (moderate)  Instructions for follow-up, activity and diet:	Please follow up with your nephrologist or primary care doctor.  Secondary Diagnosis:	BPH (benign prostatic hyperplasia)  Instructions for follow-up, activity and diet:	Please continue taking finasteride and flomax and follow up with your primary care doctor.  Secondary Diagnosis:	HTN (hypertension)  Instructions for follow-up, activity and diet:	Please continue taking amlodipine and follow up with your primary care doctor.

## 2017-01-20 NOTE — DISCHARGE NOTE ADULT - FINDINGS/TREATMENT
You had a chest tube called a PleurX catheter placed to drain your pleural effusion (lung fluid). The visiting nurse will come 3 times weekly to drain the catheter. Please call the number below on the next business day to schedule an appointment in 2 weeks with your cardiothoracic surgeon, Dr. De La Torre. Please see your primary care doctor within 1 week and have him send you to get a PA and Lateral Chest X Ray 2 days before the appointment to that you can bring it to your appointment with Dr. De La Torre. You had a chest tube called a PleurX catheter placed to drain your pleural effusion (lung fluid). The visiting nurse will come 3 times weekly, Monday/Wednesday/Friday to no more than 500cc from the catheter each session. Please call the number below on the next business day to schedule an appointment in 2 weeks with your cardiothoracic surgeon, Dr. De La Torre. Please see your primary care doctor within 1 week and have him send you to get a PA and Lateral Chest X Ray 2 days before the appointment to that you can bring it to your appointment with Dr. De La Torre. You had a chest tube called a PleurX catheter placed to drain your pleural effusion (lung fluid). The visiting nurse will come 3 times weekly, Monday/Wednesday/Friday to no more than 500cc from the catheter each session. Please call the number below on the next business day to schedule an appointment in 2 weeks with your cardiothoracic surgeon, Dr. De La Torre. Please obtain a chest x-ray (PA and lateral) and bring the results CD to your appointment with Dr. De La Torre as well as have it faxed to Dr. De La Torre at [phone].

## 2017-01-20 NOTE — DISCHARGE NOTE ADULT - ADDITIONAL INSTRUCTIONS
Please call the number below on the next business day to schedule an appointment in 2 weeks with your cardiothoracic surgeon, Dr. De La Torre. Please see your primary care doctor within 1 week and have him send you to get a PA and Lateral Chest X Ray 2 days before the appointment so that you can bring it to your appointment with Dr. De La Torre. Please follow up with your oncologist, Dr. Lee within 1-2 weeks. Please call the number below on the next business day to schedule an appointment in 2 weeks with your cardiothoracic surgeon, Dr. De La Torre. Please obtain a chest x-ray (PA and lateral) and bring the results CD to your appointment with Dr. De La Torre as well as have it faxed to Dr. De La Torre at [phone]. You were provided with a prescription for this x-ray. Please see your primary care doctor within 1 week a. Please follow up with your oncologist, Dr. Lee within 1-2 weeks. Please call the number below on the next business day to schedule an appointment in 2 weeks with your cardiothoracic surgeon, Dr. De La Torre. Please obtain a chest x-ray (PA and lateral) and bring the results CD to your appointment with Dr. De La Torre as well as have it faxed to Dr. De La Torre at [phone]. You were provided with a prescription for this x-ray. Please see your primary care doctor within 1 week. Please follow up with your oncologist, Dr. Lee within 1-2 weeks.

## 2017-01-20 NOTE — DISCHARGE NOTE ADULT - MEDICATION SUMMARY - MEDICATIONS TO TAKE
I will START or STAY ON the medications listed below when I get home from the hospital:    finasteride 5 mg oral tablet  -- 1 tab(s) by mouth once a day  -- Indication: For BPH (benign prostatic hyperplasia)    Aspirin Enteric Coated 81 mg oral delayed release tablet  -- 1 tab(s) by mouth once a day  -- Indication: For Prevention of cardiovascular disease    oxyCODONE 5 mg oral capsule  -- 1 cap(s) by mouth every 6 hours, As Needed for severe pain MDD:4 tabs  -- Caution federal law prohibits the transfer of this drug to any person other  than the person for whom it was prescribed.  It is very important that you take or use this exactly as directed.  Do not skip doses or discontinue unless directed by your doctor.  May cause drowsiness.  Alcohol may intensify this effect.  Use care when operating dangerous machinery.  This prescription cannot be refilled.  Using more of this medication than prescribed may cause serious breathing problems.    -- Indication: For  Pain due to chest tube placement    tamsulosin 0.4 mg oral capsule  -- 1 cap(s) by mouth once a day  -- Indication: For BPH (benign prostatic hyperplasia)    amLODIPine 5 mg oral tablet  -- 1 tab(s) by mouth once a day  -- Indication: For HTN (hypertension)    nicotine 14 mg/24 hr transdermal film, extended release  -- 1 application by transdermal patch once a day  -- Indication: For Smoking cessation    folic acid 1 mg oral tablet  -- 1 tab(s) by mouth once a day  -- Indication: For vitamin

## 2017-01-20 NOTE — DISCHARGE NOTE ADULT - PROVIDER TOKENS
TOKEN:'[id number]',FREE:[LAST:[.],PHONE:[(   )    -],FAX:[(   )    -],ADDRESS:[Dr. John Feliz  Internal Medicine  [address] [place] [address]  [phone]]],TOKEN:'[id number]'

## 2017-01-20 NOTE — DISCHARGE NOTE ADULT - HOSPITAL COURSE
61M h/o HTN, CKD3, BPH, lung adenocarcinoma (dx 5/2016) s/p chemotherapy (5-9/2016) and R VATS and apical lobectomy (10/25/16) presents with acute dyspnea x 3 days in the context of progressive dyspnea for about 3 weeks. Pt came to the ED because his shortness of breath became unbearable over the last 3 days. The SOB is accompanied by a new dry cough for 1 week. Pt has R sided chest wall pain present since the VATS procedure, worse when pending down, no orthopnea. Pt denies any fever, hemoptysis, weight loss, changes in appetite, fatigue, or malaise. Pt says that he never really felt short of breath before 3 weeks ago and could walk about a mile in early December. He said that his lung cancer presented with an incidental nodule on chest x ray 1/2016 and was never really symptomatic. Biopsy May 2016 confirmed malignancy. He received underwent chemotherapy and VATS and had no treatment since the procedure as he had a lapse in his insurance. He is set to resume chemotherapy with his oncologist Dr. Lee next month along with radiation.In the ED pt's vital signs were T 97.8, HR 83, /88, RR 18, 100% on room air. Labs showed baseline normocytic anemia to Hgb 11.2 and baseline Cr 2.08. CXR revealed a large right pleural effusion. CT chest showed a large loculated right pleural effusion with near complete atelectasis of the right middle and lower lobes. He received duonebs x1.    Hospital Course:   Pt was admitted to the medicine service. Cardiothoracic surgery was consulted and placed a pigtail catheter to drain pt's malignant pleural effusion. Cytopathology of a fluid sample was sent and confirmed adenocarcinoma. Repeat CT chest showed interval drainage of a large loculated right pleural effusion, small right hydropneumothorax, and interval partial reexpansion of the right middle and lower lobes, mildly enlarged right paratracheal lymph node unchanged. Pt's pigtail catheter drained about 2.7 L since its placement 1/18. On 1/20, CT surgery placed a PleurX Catheter. Repeat chest x ray showed that the pt's pneumothorax had slighly decreased in size. Case management arranged a visiting nurse to drain the catheter three times weekly. Pt remained clinically stable with normal vitals and no hypoxia throughout his hospitalization. He is currently safe for discharge home and should follow up with CT surgeon Dr. De La Torre in 1-2 and a prescription was provided as requested by CT surgery for CXR PA and lateral to be completed 1-2 days prior to pt's appointment and should be brought to the appointment. Pt should also follow up with his primary care doctor within 1 week and with his oncologist Dr. Lee within 1-2 weeks. Pt was given oxycodone for control of severe pain associated with the chest tube. 61M h/o HTN, CKD3, BPH, lung adenocarcinoma (dx 5/2016) s/p chemotherapy (5-9/2016) and R VATS and apical lobectomy (10/25/16) presents with acute dyspnea x 3 days in the context of progressive dyspnea for about 3 weeks. Pt came to the ED because his shortness of breath became unbearable over the last 3 days. The SOB is accompanied by a new dry cough for 1 week. Pt has R sided chest wall pain present since the VATS procedure, worse when pending down, no orthopnea. Pt denies any fever, hemoptysis, weight loss, changes in appetite, fatigue, or malaise. Pt says that he never really felt short of breath before 3 weeks ago and could walk about a mile in early December. He said that his lung cancer presented with an incidental nodule on chest x ray 1/2016 and was never really symptomatic. Biopsy May 2016 confirmed malignancy. He received underwent chemotherapy and VATS and had no treatment since the procedure as he had a lapse in his insurance. He is set to resume chemotherapy with his oncologist Dr. Lee next month along with radiation.In the ED pt's vital signs were T 97.8, HR 83, /88, RR 18, 100% on room air. Labs showed baseline normocytic anemia to Hgb 11.2 and baseline Cr 2.08. CXR revealed a large right pleural effusion. CT chest showed a large loculated right pleural effusion with near complete atelectasis of the right middle and lower lobes. He received duonebs x1.    Hospital Course:   Pt was admitted to the medicine service. Cardiothoracic surgery was consulted and placed a pigtail catheter to drain pt's malignant pleural effusion. Cytopathology of a fluid sample was sent and confirmed adenocarcinoma. Repeat CT chest showed interval drainage of a large loculated right pleural effusion, small right hydropneumothorax, and interval partial reexpansion of the right middle and lower lobes, mildly enlarged right paratracheal lymph node unchanged. Pt's pigtail catheter drained about 2.7 L since its placement 1/18. On 1/20, CT surgery placed a PleurX Catheter. Repeat chest x ray showed that the pt's pneumothorax had slightly decreased in size. Case management arranged a visiting nurse to drain the catheter three times weekly. Pt remained clinically stable with normal vitals and no hypoxia throughout his hospitalization. He is currently safe for discharge home and should follow up with CT surgeon Dr. De La Torre in 1-2 and a prescription was provided as requested by CT surgery for CXR PA and lateral to be completed 1-2 days prior to pt's appointment. The results should be faxed to Dr De La Torre at [phone] and the CD should be brought to the appointment. Pt should also follow up with his primary care doctor within 1 week and with his oncologist Dr. Lee within 1-2 weeks. Pt was given oxycodone for control of severe pain associated with the chest tube. 61M h/o HTN, CKD3, BPH, lung adenocarcinoma (dx 5/2016) s/p chemotherapy (5-9/2016) and R VATS and apical lobectomy (10/25/16) presents with acute dyspnea x 3 days in the context of progressive dyspnea for about 3 weeks. Pt came to the ED because his shortness of breath became unbearable over the last 3 days. The SOB is accompanied by a new dry cough for 1 week. Pt has R sided chest wall pain present since the VATS procedure, worse when pending down, no orthopnea. Pt denies any fever, hemoptysis, weight loss, changes in appetite, fatigue, or malaise. Pt says that he never really felt short of breath before 3 weeks ago and could walk about a mile in early December. He said that his lung cancer presented with an incidental nodule on chest x ray 1/2016 and was never really symptomatic. Biopsy May 2016 confirmed malignancy. He received underwent chemotherapy and VATS and had no treatment since the procedure as he had a lapse in his insurance. He is set to resume chemotherapy with his oncologist Dr. Lee next month along with radiation.In the ED pt's vital signs were T 97.8, HR 83, /88, RR 18, 100% on room air. Labs showed baseline normocytic anemia to Hgb 11.2 and baseline Cr 2.08. CXR revealed a large right pleural effusion. CT chest showed a large loculated right pleural effusion with near complete atelectasis of the right middle and lower lobes. He received duonebs x1.    Hospital Course:   Pt was admitted to the medicine service. Cardiothoracic surgery was consulted and placed a pigtail catheter to drain pt's malignant pleural effusion. Cytopathology of a fluid sample was sent and confirmed adenocarcinoma. Repeat CT chest showed interval drainage of a large loculated right pleural effusion, small right hydropneumothorax, and interval partial reexpansion of the right middle and lower lobes, mildly enlarged right paratracheal lymph node unchanged. Pt's pigtail catheter drained about 2.7 L since its placement 1/18. On 1/20, CT surgery placed a PleurX Catheter. Repeat chest x ray showed that the pt's pneumothorax had slightly decreased in size. Case management arranged a visiting nurse to drain the catheter three times weekly. Pt remained clinically stable with normal vitals and no hypoxia throughout his hospitalization. He is currently safe for discharge home and should follow up with CT surgeon Dr. De La Torre in 1-2 weeks and a prescription was provided as requested by CT surgery for CXR PA and lateral to be completed 1-2 days prior to pt's appointment. The results should be faxed to Dr De La Torre at [phone] and the CD should be brought to the appointment. Pt should also follow up with his primary care doctor within 1 week and with his oncologist Dr. Lee within 1-2 weeks. Pt was given oxycodone for control of severe pain associated with the chest tube. Pt was counseled on smoking cessation and sent home on a nicotene patch. 61M h/o HTN, CKD3, BPH, lung adenocarcinoma (dx 5/2016) s/p chemotherapy (5-9/2016) and R VATS and apical lobectomy (10/25/16) presents with acute dyspnea x 3 days in the context of progressive dyspnea for about 3 weeks. Pt came to the ED because his shortness of breath became unbearable over the last 3 days. The SOB is accompanied by a new dry cough for 1 week. Pt has R sided chest wall pain present since the VATS procedure, worse when pending down, no orthopnea. Pt denies any fever, hemoptysis, weight loss, changes in appetite, fatigue, or malaise. Pt says that he never really felt short of breath before 3 weeks ago and could walk about a mile in early December. He said that his lung cancer presented with an incidental nodule on chest x ray 1/2016 and was never really symptomatic. Biopsy May 2016 confirmed malignancy. He received underwent chemotherapy and VATS and had no treatment since the procedure as he had a lapse in his insurance. He is set to resume chemotherapy with his oncologist Dr. Lee next month along with radiation.In the ED pt's vital signs were T 97.8, HR 83, /88, RR 18, 100% on room air. Labs showed baseline normocytic anemia to Hgb 11.2 and baseline Cr 2.08. CXR revealed a large right pleural effusion. CT chest showed a large loculated right pleural effusion with near complete atelectasis of the right middle and lower lobes. He received duonebs x1.    Hospital Course:   Pt was admitted to the medicine service. Cardiothoracic surgery was consulted and placed a pigtail catheter to drain pt's malignant pleural effusion. Cytopathology of a fluid sample was sent and confirmed adenocarcinoma. Repeat CT chest showed interval drainage of a large loculated right pleural effusion, small right hydropneumothorax, and interval partial reexpansion of the right middle and lower lobes, mildly enlarged right paratracheal lymph node unchanged. Pt's pigtail catheter drained about 2.7 L since its placement 1/18. On 1/20, CT surgery placed a PleurX Catheter. Repeat chest x ray showed that the pt's pneumothorax had slightly decreased in size. Case management arranged a visiting nurse to drain the catheter three times weekly. Pt remained clinically stable with normal vitals and no hypoxia throughout his hospitalization.     He is currently safe for discharge home and should follow up with CT surgeon Dr. De La Torre in 1-2 weeks and a prescription was provided as requested by CT surgery for CXR PA and lateral to be completed 1-2 days prior to pt's appointment. The results should be faxed to Dr De La Torre at [phone] and the CD should be brought to the appointment. Pt should also follow up with his primary care doctor within 1 week and with his oncologist Dr. Lee within 1-2 weeks. Pt was given oxycodone for control of severe pain associated with the chest tube. Pt was counseled on smoking cessation and sent home on a nicotine patch.

## 2017-01-20 NOTE — DISCHARGE NOTE ADULT - CARE PROVIDER_API CALL
Paul Lee), Internal Medicine  [address] [address] [place] [address]  Phone: [phone]  Fax: [phone]    .,   Dr. John Feliz  Internal Medicine  [address] [place] [address]  [phone]  Phone: (   )    -  Fax: (   )    -    Salvatore De La Torre), Surgery; Thoracic Surgery  [address] [place] [address]  Phone: [phone]  Fax: [phone]

## 2017-01-20 NOTE — DISCHARGE NOTE ADULT - CARE PROVIDERS DIRECT ADDRESSES
,DirectAddress_Unknown,DirectAddress_Unknown,[email or web address],DirectAddress_Unknown

## 2017-01-20 NOTE — DISCHARGE NOTE ADULT - HOME CARE AGENCY
[facility] [phone].  Nurse to visit on the day after discharge.  Other appropriate services to be arranged thereafter.   [facility] [phone]  Nurse to visit on the day after discharge.  Other appropriate services to be arranged thereafter. [facility] [phone].  Nurse to visit on the day after discharge.  Other appropriate services to be arranged thereafter

## 2017-01-23 LAB — BACTERIA FLD CULT: SIGNIFICANT CHANGE UP

## 2017-01-24 LAB
SPECIMEN SOURCE: SIGNIFICANT CHANGE UP
SURGICAL PATHOLOGY STUDY: SIGNIFICANT CHANGE UP

## 2017-02-01 LAB — SURGICAL PATHOLOGY STUDY: SIGNIFICANT CHANGE UP

## 2017-02-06 ENCOUNTER — APPOINTMENT (OUTPATIENT)
Dept: THORACIC SURGERY | Facility: CLINIC | Age: 62
End: 2017-02-06

## 2017-02-06 VITALS
DIASTOLIC BLOOD PRESSURE: 70 MMHG | OXYGEN SATURATION: 98 % | HEART RATE: 60 BPM | RESPIRATION RATE: 16 BRPM | HEIGHT: 68 IN | SYSTOLIC BLOOD PRESSURE: 110 MMHG | BODY MASS INDEX: 25.01 KG/M2 | WEIGHT: 165 LBS

## 2017-02-06 DIAGNOSIS — J90 PLEURAL EFFUSION, NOT ELSEWHERE CLASSIFIED: ICD-10-CM

## 2017-02-15 LAB — FUNGUS SPEC QL CULT: SIGNIFICANT CHANGE UP

## 2017-03-06 ENCOUNTER — OTHER (OUTPATIENT)
Age: 62
End: 2017-03-06

## 2017-03-20 ENCOUNTER — APPOINTMENT (OUTPATIENT)
Dept: THORACIC SURGERY | Facility: CLINIC | Age: 62
End: 2017-03-20

## 2017-03-20 VITALS
HEIGHT: 68 IN | BODY MASS INDEX: 24.1 KG/M2 | WEIGHT: 159 LBS | OXYGEN SATURATION: 95 % | HEART RATE: 69 BPM | SYSTOLIC BLOOD PRESSURE: 145 MMHG | RESPIRATION RATE: 16 BRPM | DIASTOLIC BLOOD PRESSURE: 80 MMHG

## 2017-03-28 ENCOUNTER — FORM ENCOUNTER (OUTPATIENT)
Age: 62
End: 2017-03-28

## 2017-03-28 NOTE — H&P PST ADULT - LYMPHATIC
anterior cervical L/axillary L/posterior cervical L/supraclavicular R/posterior cervical R/anterior cervical R/axillary R/supraclavicular L

## 2017-03-28 NOTE — H&P PST ADULT - HISTORY OF PRESENT ILLNESS
61M h/o HTN, CKD3, BPH, lung adenocarcinoma (dx 5/2016) s/p chemotherapy (5-9/2016) and R VATS and R apical lobectomy (10/25/16) presents with acute dyspnea x 3 days in the context of progressive dyspnea for about 3 weeks. Pt came to the ED because his shortness of breath became unbearable over the last 3 days. The SOB is accompanied by a new dry cough for 1 week. Pt has R sided chest wall pain present since the VATS procedure, worse when pending down, no orthopnea. Pt denies any fever, hemoptysis, weight loss, changes in appetite, fatigue, or malaise.     Pt says that he never really felt short of breath before 3 weeks ago and could walk about a mile in early December. He said that his lung cancer presented with an incidental nodule on chest x ray 1/2016 and was never really symptomatic. Biopsy May 2016 confirmed malignancy. He received underwent chemotherapy and VATS and had no treatment since the procedure as he had a lapse in his insurance. He is set to resume chemotherapy with his oncologist Dr. Lee next month along with radiation.    In the ED pt's vital signs were T 97.8, HR 83, /88, RR 18, 100% on room air. Labs showed baseline normocytic anemia to Hgb 11.2 and baseline Cr 2.08. CXR revealed a large right pleural effusion. CT chest showed a large loculated right pleural effusion with near complete atelectasis of the right middle and lower lobes. He received duoneb x1.   Patient underwent  Right video-assisted thoracoscopic surgery, pleural biopsy, placement of Pleurx catheter on 01/20/2017.  Today presents for Right PleurX catheter removal.

## 2017-03-28 NOTE — H&P PST ADULT - RS GEN PE MLT RESP DETAILS PC
diminished breath sounds, R/no rales/Right PleurX catheter in place/good air movement/respirations non-labored/no wheezes/no rhonchi/chest wall tenderness

## 2017-03-29 ENCOUNTER — OUTPATIENT (OUTPATIENT)
Dept: OUTPATIENT SERVICES | Facility: HOSPITAL | Age: 62
LOS: 1 days | Discharge: HOME | End: 2017-03-29
Payer: MEDICAID

## 2017-03-29 ENCOUNTER — APPOINTMENT (OUTPATIENT)
Dept: THORACIC SURGERY | Facility: HOSPITAL | Age: 62
End: 2017-03-29

## 2017-03-29 VITALS
WEIGHT: 167.99 LBS | TEMPERATURE: 98 F | SYSTOLIC BLOOD PRESSURE: 155 MMHG | OXYGEN SATURATION: 96 % | HEIGHT: 68 IN | RESPIRATION RATE: 16 BRPM | DIASTOLIC BLOOD PRESSURE: 94 MMHG | HEART RATE: 72 BPM

## 2017-03-29 VITALS
SYSTOLIC BLOOD PRESSURE: 138 MMHG | HEART RATE: 72 BPM | OXYGEN SATURATION: 100 % | RESPIRATION RATE: 16 BRPM | DIASTOLIC BLOOD PRESSURE: 88 MMHG

## 2017-03-29 DIAGNOSIS — Z98.89 OTHER SPECIFIED POSTPROCEDURAL STATES: Chronic | ICD-10-CM

## 2017-03-29 DIAGNOSIS — Z90.2 ACQUIRED ABSENCE OF LUNG [PART OF]: Chronic | ICD-10-CM

## 2017-03-29 DIAGNOSIS — Z90.89 ACQUIRED ABSENCE OF OTHER ORGANS: Chronic | ICD-10-CM

## 2017-03-29 DIAGNOSIS — Z90.49 ACQUIRED ABSENCE OF OTHER SPECIFIED PARTS OF DIGESTIVE TRACT: Chronic | ICD-10-CM

## 2017-03-29 DIAGNOSIS — J90 PLEURAL EFFUSION, NOT ELSEWHERE CLASSIFIED: ICD-10-CM

## 2017-03-29 PROCEDURE — 71010: CPT | Mod: 26

## 2017-03-29 NOTE — ASU PATIENT PROFILE, ADULT - VISION (WITH CORRECTIVE LENSES IF THE PATIENT USUALLY WEARS THEM):
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

## 2017-03-29 NOTE — ASU DISCHARGE PLAN (ADULT/PEDIATRIC). - NOTIFY
Bleeding that does not stop/Fever greater than 101/Pain not relieved by Medications/Swelling that continues

## 2017-03-29 NOTE — ASU DISCHARGE PLAN (ADULT/PEDIATRIC). - MEDICATION SUMMARY - MEDICATIONS TO TAKE
I will START or STAY ON the medications listed below when I get home from the hospital:    finasteride 5 mg oral tablet  -- 1 tab(s) by mouth once a day  -- Indication: For BPH    Aspirin Enteric Coated 81 mg oral delayed release tablet  -- 1 tab(s) by mouth once a day  -- Indication: For vessel protection    oxyCODONE 5 mg oral capsule  -- 1 cap(s) by mouth every 6 hours, As Needed for severe pain MDD:4 tabs  -- Caution federal law prohibits the transfer of this drug to any person other  than the person for whom it was prescribed.  It is very important that you take or use this exactly as directed.  Do not skip doses or discontinue unless directed by your doctor.  May cause drowsiness.  Alcohol may intensify this effect.  Use care when operating dangerous machinery.  This prescription cannot be refilled.  Using more of this medication than prescribed may cause serious breathing problems.    -- Indication: For Pain control    tamsulosin 0.4 mg oral capsule  -- 1 cap(s) by mouth once a day  -- Indication: For BPH    amLODIPine 5 mg oral tablet  -- 1 tab(s) by mouth once a day  -- Indication: For HTN    nicotine 14 mg/24 hr transdermal film, extended release  -- 1 application by transdermal patch once a day  -- Indication: For smoking cessation    folic acid 1 mg oral tablet  -- 1 tab(s) by mouth once a day  -- Indication: For vitamin

## 2017-04-03 ENCOUNTER — APPOINTMENT (OUTPATIENT)
Dept: THORACIC SURGERY | Facility: CLINIC | Age: 62
End: 2017-04-03

## 2017-04-06 ENCOUNTER — TRANSCRIPTION ENCOUNTER (OUTPATIENT)
Age: 62
End: 2017-04-06

## 2017-04-20 ENCOUNTER — APPOINTMENT (OUTPATIENT)
Dept: THORACIC SURGERY | Facility: CLINIC | Age: 62
End: 2017-04-20

## 2017-04-20 VITALS
DIASTOLIC BLOOD PRESSURE: 80 MMHG | HEIGHT: 68 IN | BODY MASS INDEX: 23.19 KG/M2 | WEIGHT: 153 LBS | RESPIRATION RATE: 14 BRPM | SYSTOLIC BLOOD PRESSURE: 110 MMHG | HEART RATE: 74 BPM | OXYGEN SATURATION: 98 %

## 2017-04-24 ENCOUNTER — INPATIENT (INPATIENT)
Facility: HOSPITAL | Age: 62
LOS: 2 days | Discharge: HOME | End: 2017-04-27
Attending: INTERNAL MEDICINE | Admitting: INTERNAL MEDICINE
Payer: MEDICAID

## 2017-04-24 VITALS
DIASTOLIC BLOOD PRESSURE: 90 MMHG | SYSTOLIC BLOOD PRESSURE: 141 MMHG | RESPIRATION RATE: 18 BRPM | OXYGEN SATURATION: 100 % | HEART RATE: 82 BPM

## 2017-04-24 DIAGNOSIS — Z98.89 OTHER SPECIFIED POSTPROCEDURAL STATES: Chronic | ICD-10-CM

## 2017-04-24 DIAGNOSIS — Z90.2 ACQUIRED ABSENCE OF LUNG [PART OF]: Chronic | ICD-10-CM

## 2017-04-24 DIAGNOSIS — C79.31 SECONDARY MALIGNANT NEOPLASM OF BRAIN: ICD-10-CM

## 2017-04-24 DIAGNOSIS — Z90.49 ACQUIRED ABSENCE OF OTHER SPECIFIED PARTS OF DIGESTIVE TRACT: Chronic | ICD-10-CM

## 2017-04-24 DIAGNOSIS — Z90.89 ACQUIRED ABSENCE OF OTHER ORGANS: Chronic | ICD-10-CM

## 2017-04-24 LAB
ALBUMIN SERPL ELPH-MCNC: 3.1 G/DL — LOW (ref 3.3–5)
ALP SERPL-CCNC: 75 U/L — SIGNIFICANT CHANGE UP (ref 40–120)
ALT FLD-CCNC: 23 U/L — SIGNIFICANT CHANGE UP (ref 4–41)
APTT BLD: 31.3 SEC — SIGNIFICANT CHANGE UP (ref 27.5–37.4)
AST SERPL-CCNC: 26 U/L — SIGNIFICANT CHANGE UP (ref 4–40)
BASE EXCESS BLDV CALC-SCNC: 1.9 MMOL/L — SIGNIFICANT CHANGE UP
BASOPHILS # BLD AUTO: 0.01 K/UL — SIGNIFICANT CHANGE UP (ref 0–0.2)
BASOPHILS NFR BLD AUTO: 0.1 % — SIGNIFICANT CHANGE UP (ref 0–2)
BILIRUB SERPL-MCNC: 0.3 MG/DL — SIGNIFICANT CHANGE UP (ref 0.2–1.2)
BLOOD GAS VENOUS - CREATININE: 2.46 MG/DL — HIGH (ref 0.5–1.3)
BUN SERPL-MCNC: 19 MG/DL — SIGNIFICANT CHANGE UP (ref 7–23)
CALCIUM SERPL-MCNC: 8.9 MG/DL — SIGNIFICANT CHANGE UP (ref 8.4–10.5)
CHLORIDE BLDV-SCNC: 109 MMOL/L — HIGH (ref 96–108)
CHLORIDE SERPL-SCNC: 105 MMOL/L — SIGNIFICANT CHANGE UP (ref 98–107)
CK MB BLD-MCNC: 1.04 NG/ML — SIGNIFICANT CHANGE UP (ref 1–6.6)
CK SERPL-CCNC: 61 U/L — SIGNIFICANT CHANGE UP (ref 30–200)
CO2 SERPL-SCNC: 25 MMOL/L — SIGNIFICANT CHANGE UP (ref 22–31)
CREAT SERPL-MCNC: 2.41 MG/DL — HIGH (ref 0.5–1.3)
EOSINOPHIL # BLD AUTO: 0.03 K/UL — SIGNIFICANT CHANGE UP (ref 0–0.5)
EOSINOPHIL NFR BLD AUTO: 0.2 % — SIGNIFICANT CHANGE UP (ref 0–6)
GAS PNL BLDV: 139 MMOL/L — SIGNIFICANT CHANGE UP (ref 136–146)
GLUCOSE BLDV-MCNC: 88 — SIGNIFICANT CHANGE UP (ref 70–99)
GLUCOSE SERPL-MCNC: 89 MG/DL — SIGNIFICANT CHANGE UP (ref 70–99)
HCO3 BLDV-SCNC: 25 MMOL/L — SIGNIFICANT CHANGE UP (ref 20–27)
HCT VFR BLD CALC: 25 % — LOW (ref 39–50)
HCT VFR BLDV CALC: 25.8 % — LOW (ref 39–51)
HGB BLD-MCNC: 7.9 G/DL — LOW (ref 13–17)
HGB BLDV-MCNC: 8.3 G/DL — LOW (ref 13–17)
IMM GRANULOCYTES NFR BLD AUTO: 0.8 % — SIGNIFICANT CHANGE UP (ref 0–1.5)
INR BLD: 1.19 — HIGH (ref 0.88–1.17)
LACTATE BLDV-MCNC: 0.8 MMOL/L — SIGNIFICANT CHANGE UP (ref 0.5–2)
LYMPHOCYTES # BLD AUTO: 1.43 K/UL — SIGNIFICANT CHANGE UP (ref 1–3.3)
LYMPHOCYTES # BLD AUTO: 10.7 % — LOW (ref 13–44)
MCHC RBC-ENTMCNC: 27.6 PG — SIGNIFICANT CHANGE UP (ref 27–34)
MCHC RBC-ENTMCNC: 31.6 % — LOW (ref 32–36)
MCV RBC AUTO: 87.4 FL — SIGNIFICANT CHANGE UP (ref 80–100)
MONOCYTES # BLD AUTO: 2.39 K/UL — HIGH (ref 0–0.9)
MONOCYTES NFR BLD AUTO: 17.8 % — HIGH (ref 2–14)
NEUTROPHILS # BLD AUTO: 9.45 K/UL — HIGH (ref 1.8–7.4)
NEUTROPHILS NFR BLD AUTO: 70.4 % — SIGNIFICANT CHANGE UP (ref 43–77)
PCO2 BLDV: 48 MMHG — SIGNIFICANT CHANGE UP (ref 41–51)
PH BLDV: 7.37 PH — SIGNIFICANT CHANGE UP (ref 7.32–7.43)
PLATELET # BLD AUTO: 304 K/UL — SIGNIFICANT CHANGE UP (ref 150–400)
PMV BLD: 10.2 FL — SIGNIFICANT CHANGE UP (ref 7–13)
PO2 BLDV: < 24 MMHG — LOW (ref 35–40)
POTASSIUM BLDV-SCNC: 5.1 MMOL/L — HIGH (ref 3.4–4.5)
POTASSIUM SERPL-MCNC: 5.3 MMOL/L — SIGNIFICANT CHANGE UP (ref 3.5–5.3)
POTASSIUM SERPL-SCNC: 5.3 MMOL/L — SIGNIFICANT CHANGE UP (ref 3.5–5.3)
PROT SERPL-MCNC: 7.9 G/DL — SIGNIFICANT CHANGE UP (ref 6–8.3)
PROTHROM AB SERPL-ACNC: 13.4 SEC — HIGH (ref 9.8–13.1)
RBC # BLD: 2.86 M/UL — LOW (ref 4.2–5.8)
RBC # FLD: 23.4 % — HIGH (ref 10.3–14.5)
SAO2 % BLDV: 27.5 % — LOW (ref 60–85)
SODIUM SERPL-SCNC: 141 MMOL/L — SIGNIFICANT CHANGE UP (ref 135–145)
TROPONIN T SERPL-MCNC: < 0.06 NG/ML — SIGNIFICANT CHANGE UP (ref 0–0.06)
WBC # BLD: 13.42 K/UL — HIGH (ref 3.8–10.5)
WBC # FLD AUTO: 13.42 K/UL — HIGH (ref 3.8–10.5)

## 2017-04-24 PROCEDURE — 70450 CT HEAD/BRAIN W/O DYE: CPT | Mod: 26

## 2017-04-24 PROCEDURE — 71020: CPT | Mod: 26

## 2017-04-24 RX ORDER — MORPHINE SULFATE 50 MG/1
4 CAPSULE, EXTENDED RELEASE ORAL ONCE
Qty: 0 | Refills: 0 | Status: DISCONTINUED | OUTPATIENT
Start: 2017-04-24 | End: 2017-04-24

## 2017-04-24 RX ORDER — KETOTIFEN FUMARATE 0.34 MG/ML
1 SOLUTION OPHTHALMIC
Qty: 0 | Refills: 0 | COMMUNITY

## 2017-04-24 RX ORDER — ACETAMINOPHEN 500 MG
2 TABLET ORAL
Qty: 0 | Refills: 0 | COMMUNITY

## 2017-04-24 RX ORDER — OMEPRAZOLE 10 MG/1
1 CAPSULE, DELAYED RELEASE ORAL
Qty: 0 | Refills: 0 | COMMUNITY

## 2017-04-24 RX ORDER — SOD,AMMONIUM,POTASSIUM LACTATE
1 CREAM (GRAM) TOPICAL
Qty: 0 | Refills: 0 | COMMUNITY

## 2017-04-24 RX ORDER — ALBUTEROL 90 UG/1
2 AEROSOL, METERED ORAL
Qty: 0 | Refills: 0 | COMMUNITY

## 2017-04-24 RX ORDER — FENTANYL CITRATE 50 UG/ML
1 INJECTION INTRAVENOUS
Qty: 0 | Refills: 0 | COMMUNITY

## 2017-04-24 RX ORDER — AMLODIPINE BESYLATE 2.5 MG/1
5 TABLET ORAL DAILY
Qty: 0 | Refills: 0 | Status: DISCONTINUED | OUTPATIENT
Start: 2017-04-24 | End: 2017-04-27

## 2017-04-24 RX ORDER — ASPIRIN/CALCIUM CARB/MAGNESIUM 324 MG
1 TABLET ORAL
Qty: 0 | Refills: 0 | COMMUNITY

## 2017-04-24 RX ORDER — MEGESTROL ACETATE 40 MG/ML
20 SUSPENSION ORAL
Qty: 0 | Refills: 0 | COMMUNITY

## 2017-04-24 RX ADMIN — MORPHINE SULFATE 4 MILLIGRAM(S): 50 CAPSULE, EXTENDED RELEASE ORAL at 20:20

## 2017-04-24 NOTE — H&P ADULT. - LYMPHATIC
supraclavicular R/anterior cervical R/anterior cervical L/posterior cervical R/supraclavicular L/posterior cervical L

## 2017-04-24 NOTE — ED ADULT NURSE NOTE - OBJECTIVE STATEMENT
pt alert and oriented, ambulatory, states that fr the past 3 weeks he has been feeling constant sob, worsening with exertion and chest pain. pt states in october he had a right lung resection and is on chemo for lung ca. pt also c/o headache, and state that yesterday he had 3 episodes of 'seeing rainbows' which lasted abut 45 seconds.

## 2017-04-24 NOTE — H&P ADULT. - GASTROINTESTINAL DETAILS
soft/no distention/nontender/normal no rigidity/bowel sounds normal/nontender/no distention/normal/soft/no guarding

## 2017-04-24 NOTE — H&P ADULT. - NEGATIVE CARDIOVASCULAR SYMPTOMS
no paroxysmal nocturnal dyspnea/no palpitations/no orthopnea/no chest pain no palpitations/no paroxysmal nocturnal dyspnea/no orthopnea/no peripheral edema/no chest pain

## 2017-04-24 NOTE — ED PROVIDER NOTE - MEDICAL DECISION MAKING DETAILS
62yo M PMHx Lung Ca, HTN p/w CC SOB/HA/weakness - will send for cbc, cmp, cardiac enzymes, cxr, ekg, CT head and reassess.

## 2017-04-24 NOTE — H&P ADULT. - ATTENDING COMMENTS
Briefly, 61 -year-old male with HTN, CKD3, BPH, lung adenocarcinoma s/p lobectomy, on CT, presenting with progressive shortness of breath and left sided headaches found to have new brain lesions concerning for metastases.  Patient made aware, neurosurgery consulted, started patient on decadron and keppra per recommendations.  I personally spoke with Dr. Lee overnight, informed him of patient's admission and new CT findings, Dr. Lee to assume care in AM.  Hydrating for ?LISA on CKD vs worsening CKD.  Repeat labs in AM, if Cr improved order for MRI brain w/con to eval brain lesions, neurosurgery to discuss with oncologist after imaging obtained. Leukocytosis noted on labs, no S/Sx of infection at present, monitoring off of abx.  Agree with rest of H&P and plan above as edited.

## 2017-04-24 NOTE — H&P ADULT. - NEGATIVE NEUROLOGICAL SYMPTOMS
no generalized seizures/no difficulty walking/no transient paralysis/no tremors/no focal seizures/no loss of sensation/no vertigo/no loss of consciousness/no syncope

## 2017-04-24 NOTE — ED ADULT TRIAGE NOTE - CHIEF COMPLAINT QUOTE
pt c/o weakness, right sided chest pain and SOB x 3 weeks.  pt also reports visual changes yesterday.  visual changes subsided

## 2017-04-24 NOTE — H&P ADULT. - RADIOLOGY RESULTS AND INTERPRETATION
CT head reviewed: abnormality within the right frontal lobe, suggestive of an occult intracranial mass. Additional hyperdensity along the medial left parietal lobe   CXR: stable R pleural effusion

## 2017-04-24 NOTE — H&P ADULT. - EKG AND INTERPRETATION
EKG reviewed: normal sinus rhythm (HR: 80) EKG reviewed: normal sinus rhythm (HR: 80), isolated TWI in V1, peaked T in V3-V4, QTc 426ms

## 2017-04-24 NOTE — H&P ADULT. - ASSESSMENT
61 yr male w/ hx of HTN, CKD3, BPH, lung adenocarcinoma (dx 5/2016) s/p chemotherapy (5-9/2016) and VATS w/ R apical lobectomy (10/25/16) and large R pleural effusion (s/p VATS/Pleurx placement and subsequent removal on 3/29) presenting with a L sided headache x 3 weeks found to have right frontal lobe and left parietal lobe lesions on CT head 61 yr male w/ hx of HTN, CKD3, BPH, lung adenocarcinoma (dx 5/2016) on chemotherapy (last dose on 4/21) and VATS w/ R apical lobectomy (10/25/16) and large R pleural effusion (s/p VATS/Pleurx placement and subsequent removal on 3/29) presenting with a L sided headache x 3 weeks found to have right frontal lobe and left parietal lobe lesions on CT head concerning for metastatic disease

## 2017-04-24 NOTE — H&P ADULT. - PROBLEM SELECTOR PLAN 1
patient presenting with headache and found to have right frontal lobe and left parietal lobe lesions on CT head, which may represent metastatic lesions   - Pt will need MRI for further evaluation   - oxycodoneIR 5mg for moderate pain and 10mg for severe pain   - Zofran PRN for nausea  - consult heme onc in AM patient presenting with headache and found to have right frontal lobe and left parietal lobe lesions on CT head, which may represent metastatic lesions   - Pt will need MRI for further evaluation   - oxycodoneIR 5mg for moderate pain and 10mg for severe pain   - Zofran PRN for nausea  - consult heme onc in AM  - neurosurgery consult patient presenting with headache and found to have right frontal lobe and left parietal lobe lesions on CT head, which may represent metastatic lesions   - Pt will need MRI for further evaluation. However, given LISA, would hydrate first and once perform MRI once creatinine improves to baseline   - oxycodoneIR 5mg for moderate pain and 10mg for severe pain   - Zofran PRN for nausea  - consult heme onc in AM  - neurosurgery consult patient presenting with headache and found to have right frontal lobe and left parietal lobe lesions on CT head, which may represent metastatic lesions   - Pt will need MRI for further evaluation. However, given LISA, would hydrate first and perform MRI once creatinine improves to baseline   - oxycodone IR 5mg for moderate pain and 10mg for severe pain   - Zofran PRN for nausea  - consult heme onc in AM  - neurosurgery consult

## 2017-04-24 NOTE — H&P ADULT. - PROBLEM SELECTOR PLAN 4
pt with hgb: 7.9 on admission, no reported bleeding. likely 2/2 recent chemotherapy   - check iron studies, ldh/haptoglobin   - monitor cbc and transfuse to goal of 7   - check FOBT pt with hgb: 7.9 on admission, no reported bleeding. likely 2/2 recent chemotherapy   - check iron studies, ldh/haptoglobin/retic  - monitor cbc and transfuse to goal of 7   - check FOBT

## 2017-04-24 NOTE — H&P ADULT. - PROBLEM SELECTOR PLAN 2
pt with metastatic disease on active chemotherapy   - consult heme onc in AM   - pain control as above pt with metastatic disease on active chemotherapy   - patient's oncologist, Dr. Lee to assume care in AM  - pain control as above

## 2017-04-24 NOTE — H&P ADULT. - HISTORY OF PRESENT ILLNESS
61 yr male w/ hx of HTN, CKD3, BPH, lung adenocarcinoma (dx 5/2016) s/p chemotherapy (5-9/2016), RT and R VATS w/ R apical lobectomy (10/25/16) and large R pleural effusion (s/p VATS/Pleurx placement and subsequent removal on 3/29) presenting with a L sided headache x 3 weeks.       In the ED, vital signs: T:97.6, HR:82, BP:141/90, RR:18 and O2 sat: 100% on RA. Patient received morphine 4mg IV x 1. Labs were notable for leukocytosis (WBC: 13.42), hgb: 7.9 (11.1 on 1/20), INR: 1.19 and Cr: 2.41. 61 yr male w/ hx of HTN, CKD3, BPH, lung adenocarcinoma (dx 5/2016) s/p chemotherapy (5-9/2016) and VATS w/ R apical lobectomy (10/25/16) and large R pleural effusion (s/p VATS/Pleurx placement and subsequent removal on 3/29) presenting with a L sided headache x 3 weeks.     Patient reports that for the last several weeks, he has had a L sided headache that generally starts at the L base of his neck and radiates around the top of his head towards his L eye. Patient reports that he has been taking Tylenol 650mg 2-3 times a day without improvement in his symptoms. He also endorses generalized weakness, malaise, fatigue and shortness of breath since his initial lung surgery in Oct 2016.        In the ED, vital signs: T:97.6, HR:82, BP:141/90, RR:18 and O2 sat: 100% on RA. Patient received morphine 4mg IV x 1. Labs were notable for leukocytosis (WBC: 13.42), hgb: 7.9 (11.1 on 1/20), INR: 1.19 and Cr: 2.41. 61 yr male w/ hx of HTN, CKD3, BPH, lung adenocarcinoma (dx 5/2016) s/p chemotherapy (5-9/2016) and VATS w/ R apical lobectomy (10/25/16) and large R pleural effusion (s/p VATS/Pleurx placement and subsequent removal on 3/29) presenting with a L sided headache x 3 weeks.     Patient reports that for the last several weeks, he has had a L sided headache that generally starts at the L base of his neck and radiates around the top of his head towards his L eye. Patient reports that he has been taking Tylenol 650mg 2-3 times a day without improvement in his symptoms. He also endorses generalized weakness, malaise, fatigue and shortness of breath since his initial lung surgery in Oct 2016. Pt states that the shortness of breath is worse with exertion and minimal at rest. He denies any fevers/chills, focal weakness, nausea/vomiting, chest pain, abdominal pain, and dysuria.     In the ED, vital signs: T:97.6, HR:82, BP:141/90, RR:18 and O2 sat: 100% on RA. Patient received morphine 4mg IV x 1. Labs were notable for leukocytosis (WBC: 13.42), hgb: 7.9 (11.1 on 1/20), INR: 1.19 and Cr: 2.41. 61 yr male w/ hx of HTN, CKD3, BPH, lung adenocarcinoma (dx 5/2016) on chemotherapy (last dose on 4/21) and VATS w/ R apical lobectomy (10/25/16) and large R pleural effusion (s/p VATS/Pleurx placement and subsequent removal on 3/29) presenting with a L sided headache x 3 weeks.     Patient reports that for the last several weeks, he has had a L sided headache that generally starts at the L base of his neck and radiates around the top of his head towards his L eye. Patient reports that he has been taking Tylenol 650mg 2-3 times a day without improvement in his symptoms. He also endorses generalized weakness, malaise, fatigue and shortness of breath since his initial lung surgery in Oct 2016. Pt states that the shortness of breath is worse with exertion and minimal at rest. He denies any fevers/chills, focal weakness, nausea/vomiting, chest pain, abdominal pain, and dysuria.     In the ED, vital signs: T:97.6, HR:82, BP:141/90, RR:18 and O2 sat: 100% on RA. Patient received morphine 4mg IV x 1. Labs were notable for leukocytosis (WBC: 13.42), hgb: 7.9 (11.1 on 1/20), INR: 1.19 and Cr: 2.41.

## 2017-04-24 NOTE — ED PROVIDER NOTE - OBJECTIVE STATEMENT
60yo M PMHx Lung Ca on chemo, HTN p/w CC SOB, weakness, HA x3 weeks. Pt. explains that he has been experiencing worsening SOB over the past 3 weeks, along with constant, left sided headache and diffuse weakness. Pt. also complains of right sided chest pain with exertion, which has been present since he had pleural effusion drained in January. Pt. denies fever, palpitations, N/V/D, numbness/tingling.

## 2017-04-24 NOTE — H&P ADULT. - PROBLEM SELECTOR PLAN 3
likely 2/2 persistent R pleural effusion   - CXR showing pleural effusion is stable in size  -  oxygen saturation stable, supplemental oxygen as needed.

## 2017-04-24 NOTE — PATIENT PROFILE ADULT. - PRO ANTICIPATED DISCH DISP
After Visit Summary      Facility     Name Address Phone Fax    [facility] [address] [place] [address] [phone]           Patient Discharge Summary   2/3/2017    Lina Leigh            Please bring this medication reconciliation form to your next doctor’s appointment(s). Please update the form if you stop taking any of these medications or you start taking any new medications including over the counter medications. Also please carry a copy of this form with you at all times in the event of an emergency.    A copy of these discharge instructions was reviewed with and given to the patient/patient representative/Guardian/Caregiver at discharge.          The doctor who took care of you in the hospital     Provider Specialty    Brown Garza MD ENT/Otolaryngology      Allergies as of 2/3/2017        Reactions    Omnicef [Cefdinir] HIVES    Zithromax [Azithromycin] HIVES           Discharge Medications              What to Do with Your Medications      CONTINUE taking these medications which have NOT CHANGED        Details    Morning Noon Evening Bedtime As needed    acetaminophen 160 MG/5ML solution   Commonly known as:  TYLENOL        Take 14 mLs by mouth every 6 hours as needed for Fever.   Authorizing Provider:  Franny Santoro                            AEROCHAMBER PLUS W/MASK Misc        Please dispense with mask. Use with inhaler as directed   Authorizing Provider:  Patti Ellsworth                            * albuterol 108 (90 BASE) MCG/ACT inhaler        2 puffs every 4hrs prn asthma symptoms   Authorizing Provider:  Patti Ellsworth                            * albuterol (2.5 MG/3ML) 0.083% nebulizer solution   Commonly known as:  VENTOLIN        Take 3 mLs by nebulization every 4 hours as needed for Wheezing or Shortness of Breath (increased cough). Dispense: 1 box   Authorizing Provider:  Patti Ellsworth                            beclomethasone diprop 80 MCG/ACT inhaler   Commonly known as:  QVAR        Inhale 1 puff into the lungs 2 times daily.   Authorizing Provider:  Patti Ellsworth                                                   * Notice:  This list has 2 medication(s) that are the same as other medications prescribed for you. Read the directions carefully, and ask your doctor or other care provider to review them with you.            Your To Do List     Future Appointments Provider Department Dept Phone    2/17/2017 1:45 PM Brown Garza MD [facility] Otolaryngology [phone]    3/15/2017 3:15 PM Patti Ellsworth MD [facility] [phone]        Discharge Instructions       [facility]  DEPARTMENT OF OTOLARYNGOLOGY - HEAD & NECK SURGERY  Brown Garza M.D.    [address] [place] [address] [phone] [id number]  [place] [address] [phone] after hours        POST-OPERATIVE EAR TUBE INSTRUCTIONS    General Considerations    1. A small amount of blood-tinged drainage from the ear may be seen in the outside of the ear canal...just cleanse the outer ear with a damp washcloth.  This will subside as the healing progresses.    2. An ear drop may be prescribed for use post-operatively.  This will be given to you at the time of surgery.  5 drops of Floxin into both ears 2 times per day for 5 days.    3. Avoid water near the ear completely until your first post-operative office visit.  At this visit we will provide you with the necessary earplugs.    Diet - advance as tolerated    Activity Restrictions  Your child will be at normal activity level the day following surgery, if not the same day as surgery.    Follow Up Instructions    1. CALL YOUR SURGEON'S OFFICE TO SCHEDULE YOUR POST-OP VISIT.    2. Complete all antibiotics if prescribed after surgery.    3. Call the doctor if any of the following are noted:   A.  Foul smelling, discolored drainage from the ear canal.   B. Fever over 100.5    4. A reminder card will be sent out every 6 months (until the tubes fall out) to remind you to schedule a 6 month tube check appointment.    PHYSICIAN CONTACT INFORMATION:  · [facility] office [phone]  · Night/Weekends (ask for ENT doctor on call) [phone]  · For all emergencies (i.e. Bleeding) [facility] [phone] or nearest ER        Discharge References/Attachments     None      Pending Labs/Results     Any lab or diagnostic test results that are \"pending\" at time of discharge are listed below. If no results display then none were \"pending\" at time of discharge. Depending on when the test was completed results may be available within 3-5 days. Results can be reviewed with your provider at your next visits or through [facility]. If needed contact the provider office for additional information.          Your Opinion Matters To Us  If you receive a patient satisfaction survey in the mail, please complete and return it in the postage-paid envelope.    We truly value and appreciate your feedback.           Lina Leigh        Your To Do List     Future Appointments Provider Department Dept Phone    2/17/2017 1:45 PM Brown Garza MD [facility] Otolaryngology [phone]    3/15/2017 3:15 PM Patti Ellsworth MD [facility] [phone]      Contact your doctor for follow-up appointment if not already scheduled.     Follow up with Brown Garza MD.    Specialty:  ENT/Otolaryngology    Comments:  As needed    Contact information    [address] [facility] [place] [id number] [phone]           Lina Leigh           Summary of your Discharge Medications      Take these Medications        Details    Morning Noon Evening Bedtime As needed    acetaminophen 160 MG/5ML solution   Commonly known as:  TYLENOL    Take 14 mLs by mouth every 6 hours as needed for Fever.                             AEROCHAMBER PLUS W/MASK Misc    Please dispense with mask. Use with inhaler as directed                            albuterol 108 (90 BASE) MCG/ACT inhaler    2 puffs every 4hrs prn asthma symptoms                            albuterol (2.5 MG/3ML) 0.083% nebulizer solution   Commonly known as:  VENTOLIN    Take 3 mLs by nebulization every 4 hours as needed for Wheezing or Shortness of Breath (increased cough). Dispense: 1 box                            beclomethasone diprop 80 MCG/ACT inhaler   Commonly known as:  QVAR    Inhale 1 puff into the lungs 2 times daily.                                                          Outpatient Administered Medication List      Notice     You have not been prescribed any facility-administered medications.       unsure

## 2017-04-24 NOTE — H&P ADULT. - RS GEN PE MLT RESP DETAILS PC
decreased bs at R base, good air entry on R/airway patent airway patent/no wheezes/no rales/no intercostal retractions/no rhonchi/decreased bs at R base, good air entry on L/respirations non-labored

## 2017-04-24 NOTE — H&P ADULT. - OTHER CARE PROVIDERS
Dr. Lee (Oncology), Dr. Hartley (renal), Dr. Darby (Rad Onc) Dr. Lisker (Cardiology), Dr. De La Torre (CT surgery)

## 2017-04-24 NOTE — ED PROVIDER NOTE - ENMT, MLM
Labs scanned, printed and mailed to pt.   Airway patent, Nasal mucosa clear. Mouth with normal mucosa. Throat has no vesicles, no oropharyngeal exudates and uvula is midline.

## 2017-04-24 NOTE — ED PROVIDER NOTE - ATTENDING CONTRIBUTION TO CARE
marjorie: several week hx of weakness, increasing shortness of breath, fevers, dry cough.   exam unremarkable. PMH noted.   will obtain labs and CXR.

## 2017-04-24 NOTE — H&P ADULT. - NEGATIVE GASTROINTESTINAL SYMPTOMS
no nausea/no constipation/no diarrhea no melena/no diarrhea/no vomiting/no constipation/no nausea/no hematochezia/no abdominal pain

## 2017-04-24 NOTE — PATIENT PROFILE ADULT. - VISION (WITH CORRECTIVE LENSES IF THE PATIENT USUALLY WEARS THEM):
Partially impaired: cannot see medication labels or newsprint, but can see obstacles in path, and the surrounding layout; can count fingers at arm's length/Left eye blindness

## 2017-04-24 NOTE — H&P ADULT. - LAB RESULTS AND INTERPRETATION
Labs reviewed, notable for leukocytosis (WBC: 13.42), hgb: 7.9 (11.1 on 1/20), INR: 1.19 and Cr: 2.41.

## 2017-04-24 NOTE — H&P ADULT. - PROBLEM SELECTOR PLAN 5
pt with creatinine of 2.41 on admission, possible LISA on CKD   - IVF for now   - monitor BMP   - avoid NSAIDs/nephrotoxins   - renal dosing of meds

## 2017-04-24 NOTE — H&P ADULT. - NEGATIVE ENMT SYMPTOMS
no vertigo/no sinus symptoms/no ear pain/no tinnitus no vertigo/no dysphagia/no tinnitus/no sinus symptoms/no ear pain

## 2017-04-25 DIAGNOSIS — D64.9 ANEMIA, UNSPECIFIED: ICD-10-CM

## 2017-04-25 DIAGNOSIS — Z29.9 ENCOUNTER FOR PROPHYLACTIC MEASURES, UNSPECIFIED: ICD-10-CM

## 2017-04-25 DIAGNOSIS — C79.31 SECONDARY MALIGNANT NEOPLASM OF BRAIN: ICD-10-CM

## 2017-04-25 DIAGNOSIS — N17.9 ACUTE KIDNEY FAILURE, UNSPECIFIED: ICD-10-CM

## 2017-04-25 DIAGNOSIS — C34.90 MALIGNANT NEOPLASM OF UNSPECIFIED PART OF UNSPECIFIED BRONCHUS OR LUNG: ICD-10-CM

## 2017-04-25 DIAGNOSIS — R06.02 SHORTNESS OF BREATH: ICD-10-CM

## 2017-04-25 DIAGNOSIS — N18.3 CHRONIC KIDNEY DISEASE, STAGE 3 (MODERATE): ICD-10-CM

## 2017-04-25 LAB
ALBUMIN SERPL ELPH-MCNC: 2.9 G/DL — LOW (ref 3.3–5)
ALP SERPL-CCNC: 70 U/L — SIGNIFICANT CHANGE UP (ref 40–120)
ALT FLD-CCNC: 20 U/L — SIGNIFICANT CHANGE UP (ref 4–41)
ANISOCYTOSIS BLD QL: SIGNIFICANT CHANGE UP
APPEARANCE UR: CLEAR — SIGNIFICANT CHANGE UP
AST SERPL-CCNC: 26 U/L — SIGNIFICANT CHANGE UP (ref 4–40)
BASOPHILS # BLD AUTO: 0.02 K/UL — SIGNIFICANT CHANGE UP (ref 0–0.2)
BASOPHILS NFR BLD AUTO: 0.1 % — SIGNIFICANT CHANGE UP (ref 0–2)
BASOPHILS NFR SPEC: 0 % — SIGNIFICANT CHANGE UP (ref 0–2)
BILIRUB SERPL-MCNC: 0.3 MG/DL — SIGNIFICANT CHANGE UP (ref 0.2–1.2)
BILIRUB UR-MCNC: NEGATIVE — SIGNIFICANT CHANGE UP
BLOOD UR QL VISUAL: NEGATIVE — SIGNIFICANT CHANGE UP
BUN SERPL-MCNC: 29 MG/DL — HIGH (ref 7–23)
BUN SERPL-MCNC: 29 MG/DL — HIGH (ref 7–23)
BUN SERPL-MCNC: 30 MG/DL — HIGH (ref 7–23)
CALCIUM SERPL-MCNC: 8.6 MG/DL — SIGNIFICANT CHANGE UP (ref 8.4–10.5)
CALCIUM SERPL-MCNC: 8.9 MG/DL — SIGNIFICANT CHANGE UP (ref 8.4–10.5)
CALCIUM SERPL-MCNC: 9 MG/DL — SIGNIFICANT CHANGE UP (ref 8.4–10.5)
CHLORIDE SERPL-SCNC: 100 MMOL/L — SIGNIFICANT CHANGE UP (ref 98–107)
CHLORIDE SERPL-SCNC: 101 MMOL/L — SIGNIFICANT CHANGE UP (ref 98–107)
CHLORIDE SERPL-SCNC: 104 MMOL/L — SIGNIFICANT CHANGE UP (ref 98–107)
CO2 SERPL-SCNC: 21 MMOL/L — LOW (ref 22–31)
CO2 SERPL-SCNC: 22 MMOL/L — SIGNIFICANT CHANGE UP (ref 22–31)
CO2 SERPL-SCNC: 22 MMOL/L — SIGNIFICANT CHANGE UP (ref 22–31)
COLOR SPEC: SIGNIFICANT CHANGE UP
CREAT ?TM UR-MCNC: 40.47 MG/DL — SIGNIFICANT CHANGE UP
CREAT SERPL-MCNC: 2.3 MG/DL — HIGH (ref 0.5–1.3)
CREAT SERPL-MCNC: 2.34 MG/DL — HIGH (ref 0.5–1.3)
CREAT SERPL-MCNC: 2.39 MG/DL — HIGH (ref 0.5–1.3)
EOSINOPHIL # BLD AUTO: 0.04 K/UL — SIGNIFICANT CHANGE UP (ref 0–0.5)
EOSINOPHIL NFR BLD AUTO: 0.3 % — SIGNIFICANT CHANGE UP (ref 0–6)
EOSINOPHIL NFR FLD: 0.9 % — SIGNIFICANT CHANGE UP (ref 0–6)
FERRITIN SERPL-MCNC: 805.4 NG/ML — HIGH (ref 30–400)
GIANT PLATELETS BLD QL SMEAR: PRESENT — SIGNIFICANT CHANGE UP
GLUCOSE SERPL-MCNC: 118 MG/DL — HIGH (ref 70–99)
GLUCOSE SERPL-MCNC: 144 MG/DL — HIGH (ref 70–99)
GLUCOSE SERPL-MCNC: 83 MG/DL — SIGNIFICANT CHANGE UP (ref 70–99)
GLUCOSE UR-MCNC: NEGATIVE — SIGNIFICANT CHANGE UP
HAPTOGLOB SERPL-MCNC: 288 MG/DL — HIGH (ref 34–200)
HCT VFR BLD CALC: 24 % — LOW (ref 39–50)
HGB BLD-MCNC: 7.7 G/DL — LOW (ref 13–17)
HYPOCHROMIA BLD QL: SIGNIFICANT CHANGE UP
IMM GRANULOCYTES NFR BLD AUTO: 0.6 % — SIGNIFICANT CHANGE UP (ref 0–1.5)
KETONES UR-MCNC: NEGATIVE — SIGNIFICANT CHANGE UP
LDH SERPL L TO P-CCNC: 230 U/L — HIGH (ref 135–225)
LEUKOCYTE ESTERASE UR-ACNC: NEGATIVE — SIGNIFICANT CHANGE UP
LYMPHOCYTES # BLD AUTO: 19.1 % — SIGNIFICANT CHANGE UP (ref 13–44)
LYMPHOCYTES # BLD AUTO: 2.74 K/UL — SIGNIFICANT CHANGE UP (ref 1–3.3)
LYMPHOCYTES NFR SPEC AUTO: 11.4 % — LOW (ref 13–44)
MACROCYTES BLD QL: SIGNIFICANT CHANGE UP
MAGNESIUM SERPL-MCNC: 2.1 MG/DL — SIGNIFICANT CHANGE UP (ref 1.6–2.6)
MANUAL SMEAR VERIFICATION: SIGNIFICANT CHANGE UP
MANUAL SMEAR VERIFICATION: SIGNIFICANT CHANGE UP
MCHC RBC-ENTMCNC: 28.3 PG — SIGNIFICANT CHANGE UP (ref 27–34)
MCHC RBC-ENTMCNC: 32.1 % — SIGNIFICANT CHANGE UP (ref 32–36)
MCV RBC AUTO: 88.2 FL — SIGNIFICANT CHANGE UP (ref 80–100)
MONOCYTES # BLD AUTO: 2.53 K/UL — HIGH (ref 0–0.9)
MONOCYTES NFR BLD AUTO: 17.6 % — HIGH (ref 2–14)
MONOCYTES NFR BLD: 11.4 % — HIGH (ref 2–9)
NEUTROPHIL AB SER-ACNC: 75.4 % — SIGNIFICANT CHANGE UP (ref 43–77)
NEUTROPHILS # BLD AUTO: 8.97 K/UL — HIGH (ref 1.8–7.4)
NEUTROPHILS NFR BLD AUTO: 62.3 % — SIGNIFICANT CHANGE UP (ref 43–77)
NITRITE UR-MCNC: NEGATIVE — SIGNIFICANT CHANGE UP
NON-SQ EPI CELLS # UR AUTO: <1 — SIGNIFICANT CHANGE UP
PH UR: 7.5 — SIGNIFICANT CHANGE UP (ref 4.6–8)
PHOSPHATE SERPL-MCNC: 3.5 MG/DL — SIGNIFICANT CHANGE UP (ref 2.5–4.5)
PLATELET # BLD AUTO: 314 K/UL — SIGNIFICANT CHANGE UP (ref 150–400)
PLATELET COUNT - ESTIMATE: NORMAL — SIGNIFICANT CHANGE UP
PMV BLD: 10.8 FL — SIGNIFICANT CHANGE UP (ref 7–13)
POTASSIUM SERPL-MCNC: 5.7 MMOL/L — HIGH (ref 3.5–5.3)
POTASSIUM SERPL-MCNC: 6.2 MMOL/L — CRITICAL HIGH (ref 3.5–5.3)
POTASSIUM SERPL-MCNC: 6.4 MMOL/L — CRITICAL HIGH (ref 3.5–5.3)
POTASSIUM SERPL-SCNC: 5.7 MMOL/L — HIGH (ref 3.5–5.3)
POTASSIUM SERPL-SCNC: 6.2 MMOL/L — CRITICAL HIGH (ref 3.5–5.3)
POTASSIUM SERPL-SCNC: 6.4 MMOL/L — CRITICAL HIGH (ref 3.5–5.3)
PROT SERPL-MCNC: 7.4 G/DL — SIGNIFICANT CHANGE UP (ref 6–8.3)
PROT UR-MCNC: 20.3 MG/DL — SIGNIFICANT CHANGE UP
PROT UR-MCNC: 30 — SIGNIFICANT CHANGE UP
RBC # BLD: 2.72 M/UL — LOW (ref 4.2–5.8)
RBC # FLD: 23.9 % — HIGH (ref 10.3–14.5)
RBC CASTS # UR COMP ASSIST: SIGNIFICANT CHANGE UP (ref 0–?)
SODIUM SERPL-SCNC: 135 MMOL/L — SIGNIFICANT CHANGE UP (ref 135–145)
SODIUM SERPL-SCNC: 138 MMOL/L — SIGNIFICANT CHANGE UP (ref 135–145)
SODIUM SERPL-SCNC: 139 MMOL/L — SIGNIFICANT CHANGE UP (ref 135–145)
SP GR SPEC: 1.01 — SIGNIFICANT CHANGE UP (ref 1–1.03)
UROBILINOGEN FLD QL: NORMAL E.U. — SIGNIFICANT CHANGE UP (ref 0.1–0.2)
UUN UR-MCNC: 265.1 MG/DL — SIGNIFICANT CHANGE UP
VARIANT LYMPHS # BLD: 0.9 % — SIGNIFICANT CHANGE UP
WBC # BLD: 14.38 K/UL — HIGH (ref 3.8–10.5)
WBC # FLD AUTO: 14.38 K/UL — HIGH (ref 3.8–10.5)
WBC UR QL: SIGNIFICANT CHANGE UP (ref 0–?)

## 2017-04-25 PROCEDURE — 93010 ELECTROCARDIOGRAM REPORT: CPT

## 2017-04-25 PROCEDURE — 99223 1ST HOSP IP/OBS HIGH 75: CPT | Mod: GC

## 2017-04-25 RX ORDER — INSULIN HUMAN 100 [IU]/ML
10 INJECTION, SOLUTION SUBCUTANEOUS ONCE
Qty: 0 | Refills: 0 | Status: COMPLETED | OUTPATIENT
Start: 2017-04-25 | End: 2017-04-25

## 2017-04-25 RX ORDER — PANTOPRAZOLE SODIUM 20 MG/1
40 TABLET, DELAYED RELEASE ORAL
Qty: 0 | Refills: 0 | Status: DISCONTINUED | OUTPATIENT
Start: 2017-04-25 | End: 2017-04-27

## 2017-04-25 RX ORDER — DOCUSATE SODIUM 100 MG
100 CAPSULE ORAL
Qty: 0 | Refills: 0 | Status: DISCONTINUED | OUTPATIENT
Start: 2017-04-25 | End: 2017-04-27

## 2017-04-25 RX ORDER — DEXAMETHASONE 0.5 MG/5ML
2 ELIXIR ORAL EVERY 6 HOURS
Qty: 0 | Refills: 0 | Status: DISCONTINUED | OUTPATIENT
Start: 2017-04-25 | End: 2017-04-27

## 2017-04-25 RX ORDER — ONDANSETRON 8 MG/1
4 TABLET, FILM COATED ORAL EVERY 8 HOURS
Qty: 0 | Refills: 0 | Status: DISCONTINUED | OUTPATIENT
Start: 2017-04-25 | End: 2017-04-27

## 2017-04-25 RX ORDER — SODIUM POLYSTYRENE SULFONATE 4.1 MEQ/G
30 POWDER, FOR SUSPENSION ORAL ONCE
Qty: 0 | Refills: 0 | Status: COMPLETED | OUTPATIENT
Start: 2017-04-25 | End: 2017-04-25

## 2017-04-25 RX ORDER — SODIUM CHLORIDE 9 MG/ML
1000 INJECTION, SOLUTION INTRAVENOUS
Qty: 0 | Refills: 0 | Status: DISCONTINUED | OUTPATIENT
Start: 2017-04-25 | End: 2017-04-26

## 2017-04-25 RX ORDER — FUROSEMIDE 40 MG
40 TABLET ORAL ONCE
Qty: 0 | Refills: 0 | Status: COMPLETED | OUTPATIENT
Start: 2017-04-25 | End: 2017-04-25

## 2017-04-25 RX ORDER — CALCIUM GLUCONATE 100 MG/ML
1 VIAL (ML) INTRAVENOUS ONCE
Qty: 0 | Refills: 0 | Status: COMPLETED | OUTPATIENT
Start: 2017-04-25 | End: 2017-04-25

## 2017-04-25 RX ORDER — DEXTROSE 50 % IN WATER 50 %
50 SYRINGE (ML) INTRAVENOUS ONCE
Qty: 0 | Refills: 0 | Status: COMPLETED | OUTPATIENT
Start: 2017-04-25 | End: 2017-04-25

## 2017-04-25 RX ORDER — INSULIN HUMAN 100 [IU]/ML
5 INJECTION, SOLUTION SUBCUTANEOUS ONCE
Qty: 0 | Refills: 0 | Status: COMPLETED | OUTPATIENT
Start: 2017-04-25 | End: 2017-04-25

## 2017-04-25 RX ORDER — OXYCODONE HYDROCHLORIDE 5 MG/1
5 TABLET ORAL EVERY 4 HOURS
Qty: 0 | Refills: 0 | Status: DISCONTINUED | OUTPATIENT
Start: 2017-04-24 | End: 2017-04-27

## 2017-04-25 RX ORDER — OXYCODONE HYDROCHLORIDE 5 MG/1
10 TABLET ORAL EVERY 4 HOURS
Qty: 0 | Refills: 0 | Status: DISCONTINUED | OUTPATIENT
Start: 2017-04-24 | End: 2017-04-27

## 2017-04-25 RX ORDER — SODIUM CHLORIDE 9 MG/ML
1000 INJECTION, SOLUTION INTRAVENOUS
Qty: 0 | Refills: 0 | Status: DISCONTINUED | OUTPATIENT
Start: 2017-04-25 | End: 2017-04-25

## 2017-04-25 RX ORDER — SODIUM CHLORIDE 9 MG/ML
1000 INJECTION INTRAMUSCULAR; INTRAVENOUS; SUBCUTANEOUS
Qty: 0 | Refills: 0 | Status: DISCONTINUED | OUTPATIENT
Start: 2017-04-25 | End: 2017-04-26

## 2017-04-25 RX ORDER — SENNA PLUS 8.6 MG/1
2 TABLET ORAL AT BEDTIME
Qty: 0 | Refills: 0 | Status: DISCONTINUED | OUTPATIENT
Start: 2017-04-25 | End: 2017-04-27

## 2017-04-25 RX ORDER — LEVETIRACETAM 250 MG/1
500 TABLET, FILM COATED ORAL EVERY 12 HOURS
Qty: 0 | Refills: 0 | Status: DISCONTINUED | OUTPATIENT
Start: 2017-04-25 | End: 2017-04-26

## 2017-04-25 RX ADMIN — Medication 40 MILLIGRAM(S): at 18:28

## 2017-04-25 RX ADMIN — PANTOPRAZOLE SODIUM 40 MILLIGRAM(S): 20 TABLET, DELAYED RELEASE ORAL at 05:58

## 2017-04-25 RX ADMIN — OXYCODONE HYDROCHLORIDE 5 MILLIGRAM(S): 5 TABLET ORAL at 21:26

## 2017-04-25 RX ADMIN — INSULIN HUMAN 5 UNIT(S): 100 INJECTION, SOLUTION SUBCUTANEOUS at 15:58

## 2017-04-25 RX ADMIN — Medication 50 MILLILITER(S): at 15:59

## 2017-04-25 RX ADMIN — SENNA PLUS 2 TABLET(S): 8.6 TABLET ORAL at 21:27

## 2017-04-25 RX ADMIN — AMLODIPINE BESYLATE 5 MILLIGRAM(S): 2.5 TABLET ORAL at 05:58

## 2017-04-25 RX ADMIN — SODIUM POLYSTYRENE SULFONATE 30 GRAM(S): 4.1 POWDER, FOR SUSPENSION ORAL at 08:54

## 2017-04-25 RX ADMIN — SODIUM CHLORIDE 100 MILLILITER(S): 9 INJECTION INTRAMUSCULAR; INTRAVENOUS; SUBCUTANEOUS at 01:50

## 2017-04-25 RX ADMIN — Medication 200 GRAM(S): at 10:40

## 2017-04-25 RX ADMIN — Medication 100 MILLIGRAM(S): at 05:58

## 2017-04-25 RX ADMIN — SODIUM CHLORIDE 100 MILLILITER(S): 9 INJECTION INTRAMUSCULAR; INTRAVENOUS; SUBCUTANEOUS at 09:33

## 2017-04-25 RX ADMIN — Medication 2 MILLIGRAM(S): at 18:27

## 2017-04-25 RX ADMIN — OXYCODONE HYDROCHLORIDE 5 MILLIGRAM(S): 5 TABLET ORAL at 05:58

## 2017-04-25 RX ADMIN — LEVETIRACETAM 400 MILLIGRAM(S): 250 TABLET, FILM COATED ORAL at 01:50

## 2017-04-25 RX ADMIN — Medication 2 MILLIGRAM(S): at 05:58

## 2017-04-25 RX ADMIN — SODIUM POLYSTYRENE SULFONATE 30 GRAM(S): 4.1 POWDER, FOR SUSPENSION ORAL at 21:55

## 2017-04-25 RX ADMIN — Medication 50 MILLILITER(S): at 08:55

## 2017-04-25 RX ADMIN — Medication 100 MILLIGRAM(S): at 18:33

## 2017-04-25 RX ADMIN — SODIUM CHLORIDE 75 MILLILITER(S): 9 INJECTION, SOLUTION INTRAVENOUS at 15:58

## 2017-04-25 RX ADMIN — OXYCODONE HYDROCHLORIDE 5 MILLIGRAM(S): 5 TABLET ORAL at 22:15

## 2017-04-25 RX ADMIN — Medication 2 MILLIGRAM(S): at 13:27

## 2017-04-25 RX ADMIN — LEVETIRACETAM 400 MILLIGRAM(S): 250 TABLET, FILM COATED ORAL at 13:28

## 2017-04-25 RX ADMIN — OXYCODONE HYDROCHLORIDE 5 MILLIGRAM(S): 5 TABLET ORAL at 06:50

## 2017-04-25 RX ADMIN — INSULIN HUMAN 10 UNIT(S): 100 INJECTION, SOLUTION SUBCUTANEOUS at 08:45

## 2017-04-25 NOTE — PROVIDER CONTACT NOTE (MEDICATION) - SITUATION
Patient potassium 6.4 Medications given as per order see EMAR. Patient potassium 6.4 Insulin given as per original order 10 units humulin insulin subq .   Order modified to 10 units humulin insulin IV push  10 units subcutaneously was already given

## 2017-04-26 LAB
ALBUMIN SERPL ELPH-MCNC: 3.1 G/DL — LOW (ref 3.3–5)
ALP SERPL-CCNC: 78 U/L — SIGNIFICANT CHANGE UP (ref 40–120)
ALT FLD-CCNC: 17 U/L — SIGNIFICANT CHANGE UP (ref 4–41)
AST SERPL-CCNC: 21 U/L — SIGNIFICANT CHANGE UP (ref 4–40)
BASOPHILS # BLD AUTO: 0 K/UL — SIGNIFICANT CHANGE UP (ref 0–0.2)
BASOPHILS NFR BLD AUTO: 0 % — SIGNIFICANT CHANGE UP (ref 0–2)
BILIRUB SERPL-MCNC: < 0.2 MG/DL — LOW (ref 0.2–1.2)
BUN SERPL-MCNC: 27 MG/DL — HIGH (ref 7–23)
CALCIUM SERPL-MCNC: 9 MG/DL — SIGNIFICANT CHANGE UP (ref 8.4–10.5)
CHLORIDE SERPL-SCNC: 99 MMOL/L — SIGNIFICANT CHANGE UP (ref 98–107)
CO2 SERPL-SCNC: 23 MMOL/L — SIGNIFICANT CHANGE UP (ref 22–31)
CREAT SERPL-MCNC: 2.19 MG/DL — HIGH (ref 0.5–1.3)
EOSINOPHIL # BLD AUTO: 0 K/UL — SIGNIFICANT CHANGE UP (ref 0–0.5)
EOSINOPHIL NFR BLD AUTO: 0 % — SIGNIFICANT CHANGE UP (ref 0–6)
GLUCOSE SERPL-MCNC: 182 MG/DL — HIGH (ref 70–99)
HCT VFR BLD CALC: 24.5 % — LOW (ref 39–50)
HGB BLD-MCNC: 7.9 G/DL — LOW (ref 13–17)
IMM GRANULOCYTES NFR BLD AUTO: 0.4 % — SIGNIFICANT CHANGE UP (ref 0–1.5)
LYMPHOCYTES # BLD AUTO: 0.89 K/UL — LOW (ref 1–3.3)
LYMPHOCYTES # BLD AUTO: 5.2 % — LOW (ref 13–44)
MCHC RBC-ENTMCNC: 27.9 PG — SIGNIFICANT CHANGE UP (ref 27–34)
MCHC RBC-ENTMCNC: 32.2 % — SIGNIFICANT CHANGE UP (ref 32–36)
MCV RBC AUTO: 86.6 FL — SIGNIFICANT CHANGE UP (ref 80–100)
MONOCYTES # BLD AUTO: 0.56 K/UL — SIGNIFICANT CHANGE UP (ref 0–0.9)
MONOCYTES NFR BLD AUTO: 3.3 % — SIGNIFICANT CHANGE UP (ref 2–14)
NEUTROPHILS # BLD AUTO: 15.63 K/UL — HIGH (ref 1.8–7.4)
NEUTROPHILS NFR BLD AUTO: 91.1 % — HIGH (ref 43–77)
PLATELET # BLD AUTO: 348 K/UL — SIGNIFICANT CHANGE UP (ref 150–400)
PMV BLD: 10.7 FL — SIGNIFICANT CHANGE UP (ref 7–13)
POTASSIUM SERPL-MCNC: 4.2 MMOL/L — SIGNIFICANT CHANGE UP (ref 3.5–5.3)
POTASSIUM SERPL-SCNC: 4.2 MMOL/L — SIGNIFICANT CHANGE UP (ref 3.5–5.3)
PROT SERPL-MCNC: 8.1 G/DL — SIGNIFICANT CHANGE UP (ref 6–8.3)
RBC # BLD: 2.83 M/UL — LOW (ref 4.2–5.8)
RBC # FLD: 23.7 % — HIGH (ref 10.3–14.5)
SODIUM SERPL-SCNC: 137 MMOL/L — SIGNIFICANT CHANGE UP (ref 135–145)
WBC # BLD: 17.14 K/UL — HIGH (ref 3.8–10.5)
WBC # FLD AUTO: 17.14 K/UL — HIGH (ref 3.8–10.5)

## 2017-04-26 PROCEDURE — 99222 1ST HOSP IP/OBS MODERATE 55: CPT

## 2017-04-26 PROCEDURE — 70553 MRI BRAIN STEM W/O & W/DYE: CPT | Mod: 26

## 2017-04-26 RX ORDER — SODIUM CHLORIDE 9 MG/ML
1000 INJECTION, SOLUTION INTRAVENOUS
Qty: 0 | Refills: 0 | Status: DISCONTINUED | OUTPATIENT
Start: 2017-04-26 | End: 2017-04-27

## 2017-04-26 RX ORDER — BENZOCAINE AND MENTHOL 5; 1 G/100ML; G/100ML
1 LIQUID ORAL
Qty: 0 | Refills: 0 | Status: DISCONTINUED | OUTPATIENT
Start: 2017-04-26 | End: 2017-04-27

## 2017-04-26 RX ADMIN — SODIUM CHLORIDE 75 MILLILITER(S): 9 INJECTION, SOLUTION INTRAVENOUS at 14:30

## 2017-04-26 RX ADMIN — AMLODIPINE BESYLATE 5 MILLIGRAM(S): 2.5 TABLET ORAL at 06:32

## 2017-04-26 RX ADMIN — Medication 2 MILLIGRAM(S): at 00:22

## 2017-04-26 RX ADMIN — OXYCODONE HYDROCHLORIDE 5 MILLIGRAM(S): 5 TABLET ORAL at 13:25

## 2017-04-26 RX ADMIN — Medication 2 MILLIGRAM(S): at 06:32

## 2017-04-26 RX ADMIN — Medication 2 MILLIGRAM(S): at 17:35

## 2017-04-26 RX ADMIN — PANTOPRAZOLE SODIUM 40 MILLIGRAM(S): 20 TABLET, DELAYED RELEASE ORAL at 06:32

## 2017-04-26 RX ADMIN — BENZOCAINE AND MENTHOL 1 LOZENGE: 5; 1 LIQUID ORAL at 13:23

## 2017-04-26 RX ADMIN — Medication 2 MILLIGRAM(S): at 13:23

## 2017-04-26 RX ADMIN — LEVETIRACETAM 400 MILLIGRAM(S): 250 TABLET, FILM COATED ORAL at 13:23

## 2017-04-26 RX ADMIN — BENZOCAINE AND MENTHOL 1 LOZENGE: 5; 1 LIQUID ORAL at 19:55

## 2017-04-26 RX ADMIN — OXYCODONE HYDROCHLORIDE 5 MILLIGRAM(S): 5 TABLET ORAL at 14:36

## 2017-04-26 RX ADMIN — Medication 100 MILLIGRAM(S): at 17:36

## 2017-04-26 RX ADMIN — Medication 100 MILLIGRAM(S): at 06:32

## 2017-04-26 RX ADMIN — LEVETIRACETAM 400 MILLIGRAM(S): 250 TABLET, FILM COATED ORAL at 00:22

## 2017-04-27 ENCOUNTER — TRANSCRIPTION ENCOUNTER (OUTPATIENT)
Age: 62
End: 2017-04-27

## 2017-04-27 VITALS
RESPIRATION RATE: 17 BRPM | SYSTOLIC BLOOD PRESSURE: 122 MMHG | TEMPERATURE: 98 F | HEART RATE: 90 BPM | OXYGEN SATURATION: 97 % | DIASTOLIC BLOOD PRESSURE: 72 MMHG

## 2017-04-27 LAB
BUN SERPL-MCNC: 23 MG/DL — SIGNIFICANT CHANGE UP (ref 7–23)
CALCIUM SERPL-MCNC: 8.7 MG/DL — SIGNIFICANT CHANGE UP (ref 8.4–10.5)
CHLORIDE SERPL-SCNC: 103 MMOL/L — SIGNIFICANT CHANGE UP (ref 98–107)
CO2 SERPL-SCNC: 23 MMOL/L — SIGNIFICANT CHANGE UP (ref 22–31)
CREAT SERPL-MCNC: 1.98 MG/DL — HIGH (ref 0.5–1.3)
GLUCOSE SERPL-MCNC: 103 MG/DL — HIGH (ref 70–99)
HCT VFR BLD CALC: 23.9 % — LOW (ref 39–50)
HGB BLD-MCNC: 7.6 G/DL — LOW (ref 13–17)
MCHC RBC-ENTMCNC: 27.5 PG — SIGNIFICANT CHANGE UP (ref 27–34)
MCHC RBC-ENTMCNC: 31.8 % — LOW (ref 32–36)
MCV RBC AUTO: 86.6 FL — SIGNIFICANT CHANGE UP (ref 80–100)
PLATELET # BLD AUTO: 333 K/UL — SIGNIFICANT CHANGE UP (ref 150–400)
PMV BLD: 9.7 FL — SIGNIFICANT CHANGE UP (ref 7–13)
POTASSIUM SERPL-MCNC: 4.3 MMOL/L — SIGNIFICANT CHANGE UP (ref 3.5–5.3)
POTASSIUM SERPL-SCNC: 4.3 MMOL/L — SIGNIFICANT CHANGE UP (ref 3.5–5.3)
RBC # BLD: 2.76 M/UL — LOW (ref 4.2–5.8)
RBC # FLD: 23.8 % — HIGH (ref 10.3–14.5)
SODIUM SERPL-SCNC: 140 MMOL/L — SIGNIFICANT CHANGE UP (ref 135–145)
WBC # BLD: 19.42 K/UL — HIGH (ref 3.8–10.5)
WBC # FLD AUTO: 19.42 K/UL — HIGH (ref 3.8–10.5)

## 2017-04-27 PROCEDURE — 99231 SBSQ HOSP IP/OBS SF/LOW 25: CPT

## 2017-04-27 RX ORDER — AMLODIPINE BESYLATE 2.5 MG/1
1 TABLET ORAL
Qty: 0 | Refills: 0 | COMMUNITY
Start: 2017-04-27

## 2017-04-27 RX ORDER — PANTOPRAZOLE SODIUM 20 MG/1
1 TABLET, DELAYED RELEASE ORAL
Qty: 7 | Refills: 0 | OUTPATIENT
Start: 2017-04-27 | End: 2017-05-04

## 2017-04-27 RX ORDER — OXYCODONE HYDROCHLORIDE 5 MG/1
1 TABLET ORAL
Qty: 20 | Refills: 0 | OUTPATIENT
Start: 2017-04-27 | End: 2017-05-02

## 2017-04-27 RX ORDER — DEXAMETHASONE 0.5 MG/5ML
1 ELIXIR ORAL
Qty: 21 | Refills: 0 | OUTPATIENT
Start: 2017-04-27 | End: 2017-05-04

## 2017-04-27 RX ADMIN — AMLODIPINE BESYLATE 5 MILLIGRAM(S): 2.5 TABLET ORAL at 06:04

## 2017-04-27 RX ADMIN — Medication 2 MILLIGRAM(S): at 00:13

## 2017-04-27 RX ADMIN — Medication 2 MILLIGRAM(S): at 06:04

## 2017-04-27 RX ADMIN — Medication 2 MILLIGRAM(S): at 12:35

## 2017-04-27 RX ADMIN — PANTOPRAZOLE SODIUM 40 MILLIGRAM(S): 20 TABLET, DELAYED RELEASE ORAL at 06:04

## 2017-04-27 RX ADMIN — BENZOCAINE AND MENTHOL 1 LOZENGE: 5; 1 LIQUID ORAL at 06:04

## 2017-04-27 NOTE — DISCHARGE NOTE ADULT - CARE PLAN
Principal Discharge DX:	Brain metastases  Goal:	continue decadron  Instructions for follow-up, activity and diet:	Continue medications. Follow up with Dr Lee tomorrow 4/28 for further evaluation and management. Keep 5/2/17 apt for Radiation therapy .

## 2017-04-27 NOTE — DISCHARGE NOTE ADULT - MEDICATION SUMMARY - MEDICATIONS TO TAKE
I will START or STAY ON the medications listed below when I get home from the hospital:    dexamethasone 2 mg oral tablet  -- 1 tab(s) by mouth every 8 hours  -- It is very important that you take or use this exactly as directed.  Do not skip doses or discontinue unless directed by your doctor.  Obtain medical advice before taking any non-prescription drugs as some may affect the action of this medication.  Take with food or milk.    -- Indication: For Brain metastases    amLODIPine 5 mg oral tablet  -- 1 tab(s) by mouth once a day  -- Indication: For Blood presure     pantoprazole 40 mg oral delayed release tablet  -- 1 tab(s) by mouth once a day (before a meal)  -- Indication: For Need for prophylactic measure

## 2017-04-27 NOTE — DISCHARGE NOTE ADULT - PLAN OF CARE
continue decadron Continue medications. Follow up with Dr Lee tomorrow 4/28 for further evaluation and management. Keep 5/2/17 apt for Radiation therapy .

## 2017-04-27 NOTE — DISCHARGE NOTE ADULT - PATIENT PORTAL LINK FT
“You can access the Follow[facility] Patient Portal, offered by [facility], by registering with the following website: [email or web address]”

## 2017-04-27 NOTE — DISCHARGE NOTE ADULT - HOSPITAL COURSE
61 yr male w/ hx of HTN, CKD3, BPH, lung adenocarcinoma (dx 5/2016) s/p chemotherapy (5-9/2016) and VATS w/ R apical lobectomy (10/25/16) and large R pleural effusion (s/p VATS/Pleurx placement and subsequent removal on 3/29) presenting with a L sided headache x 3 weeks found to have brain lesions concerning for metastatic disease     Hospital Course:     BRAIN METASTASES-patient presenting with headache and found to have right frontal lobe and left parietal lobe lesions on CT head, which may represent metastatic lesions   - oxycodone IR 5mg for moderate pain and 10mg for severe pain   - Zofran PRN for nausea  -Decadron and Keppra IVl; keppra dc/'d 4/26, decadron switch to PO upon discharge.   -cxr:Unchanged loculated right pleural effusion. The left lung is normal.  -  MRI head was done showing Multiple areas of hemorrhage and edema are noted. These changes most  likely reflect hemorrhagic metastases and/or treated metastases given the   patient's clinical history.    LUNG CANCER -pt with metastatic disease on active chemotherapy  - pain control as above     SOB likely 2/2 persistent R pleural effusion  - CXR showing pleural effusion is stable in size -  oxygen saturation stable, supplemental oxygen as needed.     ANEMIA  -pt with hgb: 7.9 on admission, no reported bleeding. likely 2/2 recent chemotherapy  - monitor cbc and transfuse to goal of 7     LISA ON CKD  -Renal Following Dr. Roblero  - IVF for now   - monitor BMP  - avoid NSAIDs/nephrotoxins  - renal dosing of meds.   -UA neg, Urea nitrogen neg, utine protein/cr wnl     HYPERKALEMIA   -treated x 2 with dextrose insulin, calcium and sodium bicarb IVF, kayexalate: Repeat potasssium level 5.7    Patient to be discharged on Decadron 2mg every 8 hours and to follow up with Dr. Lee tomorrow and Radiation on 5/2. Pt stable for discharge today

## 2017-04-27 NOTE — DISCHARGE NOTE ADULT - CARE PROVIDER_API CALL
Paul Lee), Internal Medicine  [address] [address] [place] [address]  Phone: [phone]  Fax: [phone]    Radiation,   5/2/17  Phone: (   )    -  Fax: (   )    -

## 2017-04-27 NOTE — DISCHARGE NOTE ADULT - VISION (WITH CORRECTIVE LENSES IF THE PATIENT USUALLY WEARS THEM):
Left eye blindness/Partially impaired: cannot see medication labels or newsprint, but can see obstacles in path, and the surrounding layout; can count fingers at arm's length

## 2017-05-01 ENCOUNTER — OUTPATIENT (OUTPATIENT)
Dept: OUTPATIENT SERVICES | Facility: HOSPITAL | Age: 62
LOS: 1 days | Discharge: HOME | End: 2017-05-01

## 2017-05-01 DIAGNOSIS — Z98.89 OTHER SPECIFIED POSTPROCEDURAL STATES: Chronic | ICD-10-CM

## 2017-05-01 DIAGNOSIS — Z90.2 ACQUIRED ABSENCE OF LUNG [PART OF]: Chronic | ICD-10-CM

## 2017-05-01 DIAGNOSIS — Z90.49 ACQUIRED ABSENCE OF OTHER SPECIFIED PARTS OF DIGESTIVE TRACT: Chronic | ICD-10-CM

## 2017-05-01 DIAGNOSIS — Z90.89 ACQUIRED ABSENCE OF OTHER ORGANS: Chronic | ICD-10-CM

## 2017-05-02 ENCOUNTER — OUTPATIENT (OUTPATIENT)
Dept: OUTPATIENT SERVICES | Facility: HOSPITAL | Age: 62
LOS: 1 days | Discharge: HOME | End: 2017-05-02

## 2017-05-02 ENCOUNTER — APPOINTMENT (OUTPATIENT)
Dept: RADIATION ONCOLOGY | Facility: CLINIC | Age: 62
End: 2017-05-02

## 2017-05-02 VITALS
OXYGEN SATURATION: 99 % | RESPIRATION RATE: 17 BRPM | SYSTOLIC BLOOD PRESSURE: 157 MMHG | DIASTOLIC BLOOD PRESSURE: 80 MMHG | BODY MASS INDEX: 23.87 KG/M2 | WEIGHT: 161.16 LBS | HEART RATE: 70 BPM | HEIGHT: 69.06 IN

## 2017-05-02 DIAGNOSIS — Z98.89 OTHER SPECIFIED POSTPROCEDURAL STATES: Chronic | ICD-10-CM

## 2017-05-02 DIAGNOSIS — Z90.2 ACQUIRED ABSENCE OF LUNG [PART OF]: Chronic | ICD-10-CM

## 2017-05-02 DIAGNOSIS — Z90.89 ACQUIRED ABSENCE OF OTHER ORGANS: Chronic | ICD-10-CM

## 2017-05-02 DIAGNOSIS — C34.90 MALIGNANT NEOPLASM OF UNSPECIFIED PART OF UNSPECIFIED BRONCHUS OR LUNG: ICD-10-CM

## 2017-05-02 DIAGNOSIS — Z90.49 ACQUIRED ABSENCE OF OTHER SPECIFIED PARTS OF DIGESTIVE TRACT: Chronic | ICD-10-CM

## 2017-05-07 ENCOUNTER — FORM ENCOUNTER (OUTPATIENT)
Age: 62
End: 2017-05-07

## 2017-05-08 ENCOUNTER — OUTPATIENT (OUTPATIENT)
Dept: OUTPATIENT SERVICES | Facility: HOSPITAL | Age: 62
LOS: 1 days | End: 2017-05-08
Payer: MEDICAID

## 2017-05-08 ENCOUNTER — APPOINTMENT (OUTPATIENT)
Dept: MRI IMAGING | Facility: IMAGING CENTER | Age: 62
End: 2017-05-08

## 2017-05-08 DIAGNOSIS — Z90.49 ACQUIRED ABSENCE OF OTHER SPECIFIED PARTS OF DIGESTIVE TRACT: Chronic | ICD-10-CM

## 2017-05-08 DIAGNOSIS — Z98.89 OTHER SPECIFIED POSTPROCEDURAL STATES: Chronic | ICD-10-CM

## 2017-05-08 DIAGNOSIS — Z90.89 ACQUIRED ABSENCE OF OTHER ORGANS: Chronic | ICD-10-CM

## 2017-05-08 DIAGNOSIS — C34.90 MALIGNANT NEOPLASM OF UNSPECIFIED PART OF UNSPECIFIED BRONCHUS OR LUNG: ICD-10-CM

## 2017-05-08 DIAGNOSIS — Z90.2 ACQUIRED ABSENCE OF LUNG [PART OF]: Chronic | ICD-10-CM

## 2017-05-08 PROCEDURE — 82565 ASSAY OF CREATININE: CPT

## 2017-05-08 PROCEDURE — 61800 APPLY SRS HEADFRAME ADD-ON: CPT

## 2017-05-08 PROCEDURE — 61798 SRS CRANIAL LESION COMPLEX: CPT

## 2017-05-08 PROCEDURE — A9585: CPT

## 2017-05-08 PROCEDURE — 61796 SRS CRANIAL LESION SIMPLE: CPT

## 2017-05-08 PROCEDURE — 76498 UNLISTED MR PROCEDURE: CPT

## 2017-05-08 RX ORDER — FENTANYL 25 UG/H
25 PATCH, EXTENDED RELEASE TRANSDERMAL
Qty: 10 | Refills: 0 | Status: DISCONTINUED | COMMUNITY
Start: 2017-03-20 | End: 2017-05-08

## 2017-05-16 ENCOUNTER — OTHER (OUTPATIENT)
Age: 62
End: 2017-05-16

## 2017-05-16 RX ORDER — DEXAMETHASONE 4 MG/1
4 TABLET ORAL
Qty: 20 | Refills: 1 | Status: ACTIVE | COMMUNITY
Start: 2017-05-16 | End: 1900-01-01

## 2017-05-18 DIAGNOSIS — C79.31 SECONDARY MALIGNANT NEOPLASM OF BRAIN: ICD-10-CM

## 2017-05-19 ENCOUNTER — INPATIENT (INPATIENT)
Facility: HOSPITAL | Age: 62
LOS: 6 days | Discharge: NURSING, REHAB OR LONG-TERM CARE | End: 2017-05-26
Attending: INTERNAL MEDICINE | Admitting: INTERNAL MEDICINE
Payer: MEDICAID

## 2017-05-19 VITALS
TEMPERATURE: 99 F | OXYGEN SATURATION: 100 % | HEART RATE: 71 BPM | DIASTOLIC BLOOD PRESSURE: 107 MMHG | RESPIRATION RATE: 16 BRPM | SYSTOLIC BLOOD PRESSURE: 166 MMHG

## 2017-05-19 DIAGNOSIS — Z98.89 OTHER SPECIFIED POSTPROCEDURAL STATES: Chronic | ICD-10-CM

## 2017-05-19 DIAGNOSIS — Z90.2 ACQUIRED ABSENCE OF LUNG [PART OF]: Chronic | ICD-10-CM

## 2017-05-19 DIAGNOSIS — Z90.49 ACQUIRED ABSENCE OF OTHER SPECIFIED PARTS OF DIGESTIVE TRACT: Chronic | ICD-10-CM

## 2017-05-19 DIAGNOSIS — Z90.89 ACQUIRED ABSENCE OF OTHER ORGANS: Chronic | ICD-10-CM

## 2017-05-19 LAB
ALBUMIN SERPL ELPH-MCNC: 4.1 G/DL — SIGNIFICANT CHANGE UP (ref 3.3–5)
ALP SERPL-CCNC: 64 U/L — SIGNIFICANT CHANGE UP (ref 40–120)
ALT FLD-CCNC: 15 U/L — SIGNIFICANT CHANGE UP (ref 4–41)
AST SERPL-CCNC: 18 U/L — SIGNIFICANT CHANGE UP (ref 4–40)
BASE EXCESS BLDV CALC-SCNC: -2.9 MMOL/L — SIGNIFICANT CHANGE UP
BILIRUB SERPL-MCNC: 0.2 MG/DL — SIGNIFICANT CHANGE UP (ref 0.2–1.2)
BLOOD GAS VENOUS - CREATININE: 2.29 MG/DL — HIGH (ref 0.5–1.3)
BUN SERPL-MCNC: 68 MG/DL — HIGH (ref 7–23)
CALCIUM SERPL-MCNC: 9.5 MG/DL — SIGNIFICANT CHANGE UP (ref 8.4–10.5)
CHLORIDE BLDV-SCNC: 107 MMOL/L — SIGNIFICANT CHANGE UP (ref 96–108)
CHLORIDE SERPL-SCNC: 101 MMOL/L — SIGNIFICANT CHANGE UP (ref 98–107)
CO2 SERPL-SCNC: 20 MMOL/L — LOW (ref 22–31)
CREAT SERPL-MCNC: 2.19 MG/DL — HIGH (ref 0.5–1.3)
GAS PNL BLDV: 138 MMOL/L — SIGNIFICANT CHANGE UP (ref 136–146)
GLUCOSE BLDV-MCNC: 114 — HIGH (ref 70–99)
GLUCOSE SERPL-MCNC: 112 MG/DL — HIGH (ref 70–99)
HCO3 BLDV-SCNC: 21 MMOL/L — SIGNIFICANT CHANGE UP (ref 20–27)
HCT VFR BLD CALC: 23 % — LOW (ref 39–50)
HCT VFR BLDV CALC: 23.4 % — LOW (ref 39–51)
HGB BLD-MCNC: 7.2 G/DL — LOW (ref 13–17)
HGB BLDV-MCNC: 7.5 G/DL — LOW (ref 13–17)
LACTATE BLDV-MCNC: 1.5 MMOL/L — SIGNIFICANT CHANGE UP (ref 0.5–2)
MCHC RBC-ENTMCNC: 30.1 PG — SIGNIFICANT CHANGE UP (ref 27–34)
MCHC RBC-ENTMCNC: 31.3 % — LOW (ref 32–36)
MCV RBC AUTO: 96.2 FL — SIGNIFICANT CHANGE UP (ref 80–100)
PCO2 BLDV: 44 MMHG — SIGNIFICANT CHANGE UP (ref 41–51)
PH BLDV: 7.33 PH — SIGNIFICANT CHANGE UP (ref 7.32–7.43)
PLATELET # BLD AUTO: 150 K/UL — SIGNIFICANT CHANGE UP (ref 150–400)
PMV BLD: 8.7 FL — SIGNIFICANT CHANGE UP (ref 7–13)
PO2 BLDV: 29 MMHG — LOW (ref 35–40)
POTASSIUM BLDV-SCNC: 5 MMOL/L — HIGH (ref 3.4–4.5)
POTASSIUM SERPL-MCNC: 5.4 MMOL/L — HIGH (ref 3.5–5.3)
POTASSIUM SERPL-SCNC: 5.4 MMOL/L — HIGH (ref 3.5–5.3)
PROT SERPL-MCNC: 8.6 G/DL — HIGH (ref 6–8.3)
RBC # BLD: 2.39 M/UL — LOW (ref 4.2–5.8)
RBC # FLD: 26 % — HIGH (ref 10.3–14.5)
SAO2 % BLDV: 36.8 % — LOW (ref 60–85)
SODIUM SERPL-SCNC: 144 MMOL/L — SIGNIFICANT CHANGE UP (ref 135–145)
WBC # BLD: 9.4 K/UL — SIGNIFICANT CHANGE UP (ref 3.8–10.5)
WBC # FLD AUTO: 9.4 K/UL — SIGNIFICANT CHANGE UP (ref 3.8–10.5)

## 2017-05-19 NOTE — ED PROVIDER NOTE - OBJECTIVE STATEMENT
60 y/o male w/ hx of HTN, CKD3, BPH, lung adenocarcinoma (dx 5/2016) s/p chemotherapy (5-9/2016) and VATS w/ R apical lobectomy (10/25/16) and large R pleural effusion (s/p VATS/Pleurx placement and subsequent removal on 3/29), brain metastasis s/p gamma knife (may 8th) p/w dizziness and coordination and trouble speaking, endorsed trouble walking. Started steroids 4 days ago. + chills. Slowly improving chest pain. No trouble tolerating PO. No new urinary complaint.   Saw oncologist : Jerilyn benjamin. 60 y/o male w/ hx of HTN, CKD3, BPH, lung adenocarcinoma (dx 5/2016) s/p chemotherapy (5-9/2016) and VATS w/ R apical lobectomy (10/25/16) and large R pleural effusion (s/p VATS/Pleurx placement and subsequent removal on 3/29), brain metastasis s/p gamma knife (may 8th) p/w dizziness and coordination and trouble speaking, endorsed trouble walking. Started steroids 4 days ago. + chills. Slowly improving chest pain. No trouble tolerating PO. No new urinary complaint.   Saw oncologist : Paul Lee.

## 2017-05-19 NOTE — ED ADULT TRIAGE NOTE - CHIEF COMPLAINT QUOTE
comes to ED for shortness of breath, weakness, slurred speech, and loss of balance and motor skills for past 2 weeks. also has a headache off and on for 2 weeks. states today he has been "talking like im drunk" since this am. has been seeing doctors and "getting the wrong doses of meds." also has "very bad gas pain in my right side". hx lung cancer. last chemo 4/21. states cancer has spread to his brain and that's when symptoms started. appears comfortable in triage.

## 2017-05-19 NOTE — ED PROVIDER NOTE - PROGRESS NOTE DETAILS
CT head shows progression of hemorrhagic mets. Neuro consulted. Given Decadron. Will need neuro checks Q4. Spoke with Dr. Lee. Will admit under his service. CT head shows progression of hemorrhagic mets. Neuro and neurosurgery consulted.

## 2017-05-19 NOTE — ED ADULT NURSE NOTE - OBJECTIVE STATEMENT
alert no distress    resting quietly   c/o right side chest pain  weakness slurred speech poor balance and headache    ambulated with some difficulty in ED

## 2017-05-20 DIAGNOSIS — R27.0 ATAXIA, UNSPECIFIED: ICD-10-CM

## 2017-05-20 DIAGNOSIS — N18.3 CHRONIC KIDNEY DISEASE, STAGE 3 (MODERATE): ICD-10-CM

## 2017-05-20 DIAGNOSIS — G93.5 COMPRESSION OF BRAIN: ICD-10-CM

## 2017-05-20 DIAGNOSIS — R10.13 EPIGASTRIC PAIN: ICD-10-CM

## 2017-05-20 DIAGNOSIS — D49.6 NEOPLASM OF UNSPECIFIED BEHAVIOR OF BRAIN: ICD-10-CM

## 2017-05-20 DIAGNOSIS — I10 ESSENTIAL (PRIMARY) HYPERTENSION: ICD-10-CM

## 2017-05-20 LAB
CK SERPL-CCNC: 32 U/L — SIGNIFICANT CHANGE UP (ref 30–200)
TROPONIN T SERPL-MCNC: < 0.06 NG/ML — SIGNIFICANT CHANGE UP (ref 0–0.06)

## 2017-05-20 PROCEDURE — 99223 1ST HOSP IP/OBS HIGH 75: CPT

## 2017-05-20 PROCEDURE — 71020: CPT | Mod: 26

## 2017-05-20 PROCEDURE — 70450 CT HEAD/BRAIN W/O DYE: CPT | Mod: 26

## 2017-05-20 PROCEDURE — 70551 MRI BRAIN STEM W/O DYE: CPT | Mod: 26

## 2017-05-20 PROCEDURE — 99221 1ST HOSP IP/OBS SF/LOW 40: CPT | Mod: GC

## 2017-05-20 RX ORDER — ACETAMINOPHEN 500 MG
650 TABLET ORAL EVERY 6 HOURS
Qty: 0 | Refills: 0 | Status: DISCONTINUED | OUTPATIENT
Start: 2017-05-20 | End: 2017-05-26

## 2017-05-20 RX ORDER — DEXAMETHASONE 0.5 MG/5ML
10 ELIXIR ORAL ONCE
Qty: 0 | Refills: 0 | Status: COMPLETED | OUTPATIENT
Start: 2017-05-20 | End: 2017-05-20

## 2017-05-20 RX ORDER — AMLODIPINE BESYLATE 2.5 MG/1
5 TABLET ORAL DAILY
Qty: 0 | Refills: 0 | Status: DISCONTINUED | OUTPATIENT
Start: 2017-05-20 | End: 2017-05-26

## 2017-05-20 RX ORDER — SIMETHICONE 80 MG/1
80 TABLET, CHEWABLE ORAL
Qty: 0 | Refills: 0 | Status: DISCONTINUED | OUTPATIENT
Start: 2017-05-20 | End: 2017-05-26

## 2017-05-20 RX ORDER — DEXAMETHASONE 0.5 MG/5ML
4 ELIXIR ORAL EVERY 6 HOURS
Qty: 0 | Refills: 0 | Status: DISCONTINUED | OUTPATIENT
Start: 2017-05-20 | End: 2017-05-26

## 2017-05-20 RX ORDER — PANTOPRAZOLE SODIUM 20 MG/1
40 TABLET, DELAYED RELEASE ORAL
Qty: 0 | Refills: 0 | Status: DISCONTINUED | OUTPATIENT
Start: 2017-05-20 | End: 2017-05-26

## 2017-05-20 RX ORDER — ONDANSETRON 8 MG/1
4 TABLET, FILM COATED ORAL EVERY 6 HOURS
Qty: 0 | Refills: 0 | Status: DISCONTINUED | OUTPATIENT
Start: 2017-05-20 | End: 2017-05-26

## 2017-05-20 RX ORDER — SODIUM CHLORIDE 9 MG/ML
1000 INJECTION INTRAMUSCULAR; INTRAVENOUS; SUBCUTANEOUS
Qty: 0 | Refills: 0 | Status: DISCONTINUED | OUTPATIENT
Start: 2017-05-20 | End: 2017-05-22

## 2017-05-20 RX ADMIN — SODIUM CHLORIDE 60 MILLILITER(S): 9 INJECTION INTRAMUSCULAR; INTRAVENOUS; SUBCUTANEOUS at 12:55

## 2017-05-20 RX ADMIN — PANTOPRAZOLE SODIUM 40 MILLIGRAM(S): 20 TABLET, DELAYED RELEASE ORAL at 12:54

## 2017-05-20 RX ADMIN — Medication 4 MILLIGRAM(S): at 23:21

## 2017-05-20 RX ADMIN — Medication 102 MILLIGRAM(S): at 03:50

## 2017-05-20 RX ADMIN — Medication 4 MILLIGRAM(S): at 12:54

## 2017-05-20 RX ADMIN — AMLODIPINE BESYLATE 5 MILLIGRAM(S): 2.5 TABLET ORAL at 18:05

## 2017-05-20 RX ADMIN — SODIUM CHLORIDE 60 MILLILITER(S): 9 INJECTION INTRAMUSCULAR; INTRAVENOUS; SUBCUTANEOUS at 23:21

## 2017-05-20 RX ADMIN — SIMETHICONE 80 MILLIGRAM(S): 80 TABLET, CHEWABLE ORAL at 18:05

## 2017-05-20 RX ADMIN — Medication 4 MILLIGRAM(S): at 18:04

## 2017-05-20 NOTE — CONSULT NOTE ADULT - SUBJECTIVE AND OBJECTIVE BOX
60 y/o male w/ hx of HTN, CKD3, BPH, lung adenocarcinoma (dx 5/2016) s/p chemotherapy (5-9/2016) and VATS w/ R apical lobectomy (10/25/16) and large R pleural effusion (s/p VATS/Pleurx placement and subsequent removal on 3/29), brain metastasis s/p gamma knife (may 8th) p/w dizziness and coordination and trouble speaking, endorsed trouble walking. Started Decadron 2 mg Q8H 4 days ago    WDWN thin male in NAD  Vital Signs Last 24 Hrs  T(C): 36.8, Max: 37 (05-19 @ 22:01)  T(F): 98.2, Max: 98.6 (05-19 @ 22:01)  HR: 61 (61 - 71)  BP: 156/92 (156/92 - 166/107)  BP(mean): --  RR: 15 (15 - 16)  SpO2: 100% (100% - 100%)    AAO X 3, speech clear and fluent  Right pupil 4mm, briskly reactive, left pupil not visualized  EOMI, CN 2-12 otherwise grossly intact  STOUT, Right UE/LE 5/5, Left UE 4-/5, Left LE 4/5. Mild left pronator drift    Noncontrast Brain CT: There has been interval enlargement of multiple hemorrhagic  metastatic deposits within the supratentorial and infratentorial  compartments. For example, there is a 1.3 cm hemorrhagic metastatic lesion  within the left cingulate gyrus (series 2, image 19). It was previously a  punctate in size. There is new surrounding edema and mild mass effect.  Hemorrhagic lesion has also increased in size within the right paramedian  anterior occipital lobe.    Previous noted hemorrhagic foci within the cerebellar hemispheres have also  enlarged. There is a noted abnormal hyperattenuated lesion within the right  frontal lobe with surrounding edema appears similar
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
## 2017-05-20 NOTE — CONSULT NOTE ADULT - PROBLEM SELECTOR RECOMMENDATION 9
-Agree with Neurosurgery in regards to Decadron - 10 mg X 1, then 4 mg Q 6 hours  -Neurochecks Q-2 hours, may need higher level of care for at least 24 hours  -No need for AED's at this time  -MRI brain with and without contrast  -Oncology consultation  -Goals of care conversation may be appropriate -Agree with Neurosurgery in regards to Decadron - 10 mg X 1, then 4 mg Q 6 hours  -Neurochecks Q 4 hours  -No need for AED's at this time  -MRI brain with and without contrast  -Oncology consultation  -Goals of care conversation may be appropriate

## 2017-05-20 NOTE — CONSULT NOTE ADULT - ATTENDING COMMENTS
Mr. Reynolds is a 61 year old male with a history of lung cancer with mets to the brain s/p gamma knife therapy. On exam, mild dysarthria, no facial droop. Weakness left > right, legs > arms. Cerebellar ataxia R > L, gait ataxia. Brisk reflexes R > L. A/P: Awaiting MRI results. Agree with decadron. Plan per Heme/onc, neurosurgery. No further recommendations, please call with questions.

## 2017-05-20 NOTE — PATIENT PROFILE ADULT. - VISION (WITH CORRECTIVE LENSES IF THE PATIENT USUALLY WEARS THEM):
Blind in left eye/Partially impaired: cannot see medication labels or newsprint, but can see obstacles in path, and the surrounding layout; can count fingers at arm's length

## 2017-05-20 NOTE — CONSULT NOTE ADULT - ASSESSMENT
The patient is a 61 year old male with advanced stage lung cancer with metastatic spread to the brain which have what appears to have hemorrhagic transformation that are likely responsible for patients symptoms.

## 2017-05-20 NOTE — PATIENT PROFILE ADULT. - NS TRANSFER PATIENT BELONGINGS
Wrist Watch/Jewelry/Money (specify)/Cell Phone/PDA (specify)/Other belongings/samsung S5; house keys, $120/Clothing

## 2017-05-20 NOTE — H&P ADULT - FAMILY HISTORY
Father  Still living? Unknown  Family history of heart disease, Age at diagnosis: Age Unknown  Family history of heart disease, Age at diagnosis: Age Unknown

## 2017-05-20 NOTE — H&P ADULT - PROBLEM SELECTOR PLAN 1
Appreciate Neurosurgery and Neurology input.  Will continue Decadron 4mg IV q6hr. MRI of brain without contrast ordered - unable to receive contrast due to nephropathy.  Monitor neurology signs.

## 2017-05-20 NOTE — H&P ADULT - ASSESSMENT
61M acute hemorrhagic lesions causing ataxia, aphasia from brain metastasis from known lung CA complicated by CKD stage 3, HTN, and dyspepsia.  Dr. Brady to assume care in AM.

## 2017-05-20 NOTE — H&P ADULT - HISTORY OF PRESENT ILLNESS
61M with HTN, CKD stage 3, BPH, Lung CA s/p chemo, R apical lobectomy complicated by large pleural effusion s/p Pleurx catheter, subsequently discontinued, metastasis to the brain s/p gamma knife 2.5 weeks ago, presents with acute onset of slurred speech, difficulty with coordination, dizziness, and generalized headache 1 week ago, progressing to the point of unable to ambulate.  Had been taking decadron 2mg PO at home starting 4 days ago but did not notice any significant improvement. Also complains of increased gas discomfort which affected his PO intake - burping helps but temporarily.  Baseline L eye blindness but denies any visual acuity loss.  No N/V, F/C, diarrhea, LOC, SZ, CP, SOB, cough, dysuria.  In the ED, given 10mg of Decadron via IV.  At the time of the examination, he states that his symptoms have improved with mild aphasia.  Still hesitant to ambulate due to ataxia.

## 2017-05-20 NOTE — CONSULT NOTE ADULT - SUBJECTIVE AND OBJECTIVE BOX
Neurology Consult Note    Name  YARELI SCHULTE    HPI: The patient is a 61 year old male w/ a past medical history of HTN, CKD stage 3, BPH, lung adenocarcinoma (dx 5/2016) s/p chemotherapy (5-9/2016) and VATS w/ R apical lobectomy (10/25/16) and large R pleural effusion (s/p VATS/Pleurx placement and subsequent removal on 3/29), brain metastasis s/p gamma knife (may 8th) that presents with complaints of dizziness. The patient states that the symptoms started several days after the gamma knife therapy and states that the dizziness is constant and he is unable to walk normally now as a result. Patient also noted slurred speech and difficulty in speaking. Patient states that he is scheduled to resume chemotherapy soon from his oncologist.       Interval History -      Patient is a former smoker        Medications  · 	dexamethasone 2 mg oral tablet: 1 tab(s) orally every 8 hours  · 	amLODIPine 5 mg oral tablet: 1 tab(s) orally once a day  · 	pantoprazole 40 mg oral delayed release tablet: 1 tab(s) orally once a day (before a meal)      Allergies    No Known Allergies    Intolerances    Past Surgical History:  H/O eye surgery  left eye 1992  H/O hernia repair  2006  History of appendectomy  1976  History of lung biopsy  (R) 5/2016  History of tonsillectomy  1975  S/P lobectomy of lung.      Objective:   Vital Signs Last 24 Hrs  T(C): 36.8, Max: 37 (05-19 @ 22:01)  T(F): 98.2, Max: 98.6 (05-19 @ 22:01)  HR: 61 (61 - 71)  BP: 156/92 (156/92 - 166/107)  BP(mean): --  RR: 15 (15 - 16)  SpO2: 100% (100% - 100%)    General Exam:   General appearance: No acute distress                     Neurological Exam:  Mental Status: Orientated to self, date and place.  Attention intact. Moderate dysarthria, no aphasia or neglect.  Knowledge intact.  Registration intact.  Short and long term memory grossly intact.      Cranial Nerves: CN I - not tested. Right  PERRL, Left eye closed - blind, EOMI,  CN V1-3 intact to light touch and pinprick.  Subtle left facial asymmetry, Hearing intact to finger rub bilaterally.  Tongue, uvula and palate midline.  Sternocleidomastoid and Trapezius intact bilaterally.    Motor:   Tone: normal.                  Strength: 5/5 RUE/RLE, 4/5 LUE/LLE  Pronator drift: Left           Dysmeria: L> R to finger-nose-finger or heel-shin-heel    Sensation: intact to light touch, pinprick, vibration and proprioception    Deep Tendon Reflexes: 1+ bilateral biceps, triceps, brachioradialis, knee and ankle  Toes flexor bilaterally    Gait: Ataxic    Other:    05-19    144  |  101  |  68<H>  ----------------------------<  112<H>  5.4<H>   |  20<L>  |  2.19<H>    Ca    9.5      19 May 2017 23:00    TPro  8.6<H>  /  Alb  4.1  /  TBili  0.2  /  DBili  x   /  AST  18  /  ALT  15  /  AlkPhos  64  05-19 05-19    144  |  101  |  68<H>  ----------------------------<  112<H>  5.4<H>   |  20<L>  |  2.19<H>    Ca    9.5      19 May 2017 23:00    TPro  8.6<H>  /  Alb  4.1  /  TBili  0.2  /  DBili  x   /  AST  18  /  ALT  15  /  AlkPhos  64  05-19    LIVER FUNCTIONS - ( 19 May 2017 23:00 )  Alb: 4.1 g/dL / Pro: 8.6 g/dL / ALK PHOS: 64 u/L / ALT: 15 u/L / AST: 18 u/L / GGT: x             Radiology    There has been interval enlargement of multiple hemorrhagic   metastatic deposits within the supratentorial and infratentorial   compartments. For example, there is a 1.3 cm hemorrhagic metastatic   lesion within the left cingulate gyrus (series 2, image 19). It was   previously a punctate in size. There is new surrounding edema and mild   mass effect. Hemorrhagic lesion has also increased in size within the   right paramedian anterior occipital lobe.    Previous noted hemorrhagic foci within the cerebellar hemispheres have   also enlarged. There is a noted abnormal hyperattenuated lesion within   the right frontal lobe with surrounding edema appears similar

## 2017-05-20 NOTE — CONSULT NOTE ADULT - ASSESSMENT
60 YO male with lung adenocarcinoma, multiple brain metastases, S/P gamma knife treatment with worsening symptoms and exam

## 2017-05-20 NOTE — H&P ADULT - NSHPPHYSICALEXAM_GEN_ALL_CORE
Vital Signs Last 24 Hrs  T(C): 36.7, Max: 37.1 (05-20 @ 03:21)  T(F): 98, Max: 98.7 (05-20 @ 03:21)  HR: 65 (61 - 71)  BP: 136/84 (132/72 - 166/107)  BP(mean): --  RR: 17 (15 - 17)  SpO2: 100% (100% - 100%)

## 2017-05-20 NOTE — CONSULT NOTE ADULT - PROBLEM SELECTOR RECOMMENDATION 9
Admit to oncology/medicine service  Decadron 10 Mg IV X 1, then 4 Mg Q6h, taper dose as per oncology  Brain MRI for prognistic indicators and evaluate further spread of disease  No surgical intervention is warranted

## 2017-05-21 LAB
BASOPHILS # BLD AUTO: 0 K/UL — SIGNIFICANT CHANGE UP (ref 0–0.2)
BASOPHILS NFR BLD AUTO: 0 % — SIGNIFICANT CHANGE UP (ref 0–2)
BUN SERPL-MCNC: 53 MG/DL — HIGH (ref 7–23)
CALCIUM SERPL-MCNC: 9.7 MG/DL — SIGNIFICANT CHANGE UP (ref 8.4–10.5)
CHLORIDE SERPL-SCNC: 101 MMOL/L — SIGNIFICANT CHANGE UP (ref 98–107)
CO2 SERPL-SCNC: 22 MMOL/L — SIGNIFICANT CHANGE UP (ref 22–31)
CREAT SERPL-MCNC: 1.93 MG/DL — HIGH (ref 0.5–1.3)
EOSINOPHIL # BLD AUTO: 0 K/UL — SIGNIFICANT CHANGE UP (ref 0–0.5)
EOSINOPHIL NFR BLD AUTO: 0 % — SIGNIFICANT CHANGE UP (ref 0–6)
GLUCOSE SERPL-MCNC: 101 MG/DL — HIGH (ref 70–99)
HCT VFR BLD CALC: 24.5 % — LOW (ref 39–50)
HGB BLD-MCNC: 7.6 G/DL — LOW (ref 13–17)
IMM GRANULOCYTES NFR BLD AUTO: 0.4 % — SIGNIFICANT CHANGE UP (ref 0–1.5)
LYMPHOCYTES # BLD AUTO: 1.24 K/UL — SIGNIFICANT CHANGE UP (ref 1–3.3)
LYMPHOCYTES # BLD AUTO: 9.2 % — LOW (ref 13–44)
MCHC RBC-ENTMCNC: 30.5 PG — SIGNIFICANT CHANGE UP (ref 27–34)
MCHC RBC-ENTMCNC: 31 % — LOW (ref 32–36)
MCV RBC AUTO: 98.4 FL — SIGNIFICANT CHANGE UP (ref 80–100)
MONOCYTES # BLD AUTO: 0.7 K/UL — SIGNIFICANT CHANGE UP (ref 0–0.9)
MONOCYTES NFR BLD AUTO: 5.2 % — SIGNIFICANT CHANGE UP (ref 2–14)
NEUTROPHILS # BLD AUTO: 11.5 K/UL — HIGH (ref 1.8–7.4)
NEUTROPHILS NFR BLD AUTO: 85.2 % — HIGH (ref 43–77)
PLATELET # BLD AUTO: 162 K/UL — SIGNIFICANT CHANGE UP (ref 150–400)
PMV BLD: 9.3 FL — SIGNIFICANT CHANGE UP (ref 7–13)
POTASSIUM SERPL-MCNC: 5 MMOL/L — SIGNIFICANT CHANGE UP (ref 3.5–5.3)
POTASSIUM SERPL-SCNC: 5 MMOL/L — SIGNIFICANT CHANGE UP (ref 3.5–5.3)
RBC # BLD: 2.49 M/UL — LOW (ref 4.2–5.8)
RBC # FLD: 26.1 % — HIGH (ref 10.3–14.5)
SODIUM SERPL-SCNC: 138 MMOL/L — SIGNIFICANT CHANGE UP (ref 135–145)
WBC # BLD: 13.5 K/UL — HIGH (ref 3.8–10.5)
WBC # FLD AUTO: 13.5 K/UL — HIGH (ref 3.8–10.5)

## 2017-05-21 RX ADMIN — Medication 4 MILLIGRAM(S): at 17:17

## 2017-05-21 RX ADMIN — SIMETHICONE 80 MILLIGRAM(S): 80 TABLET, CHEWABLE ORAL at 04:57

## 2017-05-21 RX ADMIN — Medication 30 MILLILITER(S): at 16:34

## 2017-05-21 RX ADMIN — Medication 4 MILLIGRAM(S): at 12:32

## 2017-05-21 RX ADMIN — PANTOPRAZOLE SODIUM 40 MILLIGRAM(S): 20 TABLET, DELAYED RELEASE ORAL at 07:05

## 2017-05-21 RX ADMIN — AMLODIPINE BESYLATE 5 MILLIGRAM(S): 2.5 TABLET ORAL at 05:00

## 2017-05-21 RX ADMIN — SIMETHICONE 80 MILLIGRAM(S): 80 TABLET, CHEWABLE ORAL at 17:13

## 2017-05-21 RX ADMIN — Medication 4 MILLIGRAM(S): at 05:00

## 2017-05-22 ENCOUNTER — APPOINTMENT (OUTPATIENT)
Dept: THORACIC SURGERY | Facility: CLINIC | Age: 62
End: 2017-05-22

## 2017-05-22 RX ORDER — DOCUSATE SODIUM 100 MG
100 CAPSULE ORAL
Qty: 0 | Refills: 0 | Status: DISCONTINUED | OUTPATIENT
Start: 2017-05-22 | End: 2017-05-26

## 2017-05-22 RX ADMIN — Medication 4 MILLIGRAM(S): at 00:24

## 2017-05-22 RX ADMIN — Medication 100 MILLIGRAM(S): at 17:40

## 2017-05-22 RX ADMIN — Medication 100 MILLIGRAM(S): at 06:32

## 2017-05-22 RX ADMIN — AMLODIPINE BESYLATE 5 MILLIGRAM(S): 2.5 TABLET ORAL at 06:32

## 2017-05-22 RX ADMIN — SODIUM CHLORIDE 60 MILLILITER(S): 9 INJECTION INTRAMUSCULAR; INTRAVENOUS; SUBCUTANEOUS at 00:24

## 2017-05-22 RX ADMIN — Medication 4 MILLIGRAM(S): at 06:33

## 2017-05-22 RX ADMIN — PANTOPRAZOLE SODIUM 40 MILLIGRAM(S): 20 TABLET, DELAYED RELEASE ORAL at 06:32

## 2017-05-22 RX ADMIN — Medication 30 MILLILITER(S): at 21:31

## 2017-05-22 RX ADMIN — Medication 30 MILLILITER(S): at 03:19

## 2017-05-22 RX ADMIN — Medication 4 MILLIGRAM(S): at 17:34

## 2017-05-22 RX ADMIN — Medication 4 MILLIGRAM(S): at 12:06

## 2017-05-22 RX ADMIN — Medication 4 MILLIGRAM(S): at 23:23

## 2017-05-22 RX ADMIN — SIMETHICONE 80 MILLIGRAM(S): 80 TABLET, CHEWABLE ORAL at 17:34

## 2017-05-22 RX ADMIN — SIMETHICONE 80 MILLIGRAM(S): 80 TABLET, CHEWABLE ORAL at 06:32
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
## 2017-05-22 NOTE — PROGRESS NOTE ADULT - SUBJECTIVE AND OBJECTIVE BOX
Patient is a 61y old  Male who presents with a chief complaint of I can't walk (20 May 2017 11:11)      INTERVAL HPI/OVERNIGHT EVENTS:    MEDICATIONS  (STANDING):  pantoprazole    Tablet 40milliGRAM(s) Oral before breakfast  amLODIPine   Tablet 5milliGRAM(s) Oral daily  simethicone 80milliGRAM(s) Chew two times a day  sodium chloride 0.9%. 1000milliLiter(s) IV Continuous <Continuous>  dexamethasone  Injectable 4milliGRAM(s) IV Push every 6 hours  docusate sodium 100milliGRAM(s) Oral two times a day    MEDICATIONS  (PRN):  acetaminophen   Tablet 650milliGRAM(s) Oral every 6 hours PRN For Temp greater than 38.5 C (101.3 F)  acetaminophen   Tablet. 650milliGRAM(s) Oral every 6 hours PRN mild to moderate pain  ondansetron Injectable 4milliGRAM(s) IV Push every 6 hours PRN Nausea  aluminum hydroxide/magnesium hydroxide/simethicone Suspension 30milliLiter(s) Oral every 6 hours PRN Dyspepsia        Orders last 24 hours:  aluminum hydroxide/magnesium hydroxide/simethicone Suspension: [Ordered as MAALOX]  30 milliLiter(s), Oral, every 6 hours, PRN for Dyspepsia  Administration Instructions: shake well  Provider&#x27;s Contact #: [phone] (05-21 @ 12:38)  Transthoracic Echocardiogram:   Transport: [facility]  Monitor: w/o Monitor  Provider&#x27;s Contact #: [phone] (05-21 @ 12:52)  docusate sodium: [Ordered as COLACE]  100 milliGRAM(s), Oral, two times a day (05-22 @ 05:01)  Echocardiogram, Adult (05-22 @ 07:20)      Allergies    No Known Allergies    Intolerances        REVIEW OF SYSTEMS:  CARDIOVASCULAR: No chest pain, palpitations, dizziness, or leg swelling; no shortness of breath     RESPIRATORY: No cough, wheezing, chills or hemoptysis; No shortness of breath    GASTROINTESTINAL: No abdominal or epigastric pain. No nausea, vomiting, or hematemesis; No diarrhea or constipation. No melena or hematochezia.    NEUROLOGICAL: No headaches, memory loss, loss of strength, numbness      PHYSICAL EXAM:  Vital Signs Last 24 Hrs  T(C): 36.8, Max: 37 (05-21 @ 22:30)  T(F): 98.3, Max: 98.6 (05-21 @ 22:30)  HR: 100 (69 - 100)  BP: 151/83 (135/82 - 151/83)  BP(mean): --  RR: 18 (18 - 19)  SpO2: 100% (99% - 100%)    GENERAL: NAD, well-groomed, well-developed  HEAD:  Atraumatic, Normocephalic  EYES: EOMI, PERRLA, conjunctiva and sclera clear  NECK: Supple, No JVD, Normal thyroid  NERVOUS SYSTEM:  Alert & Oriented X3, Good concentration;  and symmetric  CHEST/LUNG: Clear to auscultation bilaterally; No rales, rhonchi, wheezing, or rubs  HEART: S1S2 regular, prominent second heart sound, without murmur  ABDOMEN: Soft, Nontender, Nondistended; Bowel sounds present  EXTREMITIES:  2+ Peripheral Pulses, No clubbing, cyanosis, or edema      LABS:      Ca    9.7        21 May 2017 05:15        Consultant(s) Notes Reviewed:        assessment:  metastatic lung cancer    Plan:   for echocardiogram  Neuro follow up    Care Discussed with Consultants/Other Providers:  Dr De La Torre , CT Surgeon. For Dr Arias    Patient is a 61y old  Male who presents with a chief complaint of I can't walk (20 May 2017 11:11)      INTERVAL HPI/OVERNIGHT EVENTS:    MEDICATIONS  (STANDING):  pantoprazole    Tablet 40milliGRAM(s) Oral before breakfast  amLODIPine   Tablet 5milliGRAM(s) Oral daily  simethicone 80milliGRAM(s) Chew two times a day  sodium chloride 0.9%. 1000milliLiter(s) IV Continuous <Continuous>  dexamethasone  Injectable 4milliGRAM(s) IV Push every 6 hours  docusate sodium 100milliGRAM(s) Oral two times a day    MEDICATIONS  (PRN):  acetaminophen   Tablet 650milliGRAM(s) Oral every 6 hours PRN For Temp greater than 38.5 C (101.3 F)  acetaminophen   Tablet. 650milliGRAM(s) Oral every 6 hours PRN mild to moderate pain  ondansetron Injectable 4milliGRAM(s) IV Push every 6 hours PRN Nausea  aluminum hydroxide/magnesium hydroxide/simethicone Suspension 30milliLiter(s) Oral every 6 hours PRN Dyspepsia        Orders last 24 hours:  aluminum hydroxide/magnesium hydroxide/simethicone Suspension: [Ordered as MAALOX]  30 milliLiter(s), Oral, every 6 hours, PRN for Dyspepsia  Administration Instructions: shake well  Provider&#x27;s Contact #: [phone] (05-21 @ 12:38)  Transthoracic Echocardiogram:   Transport: [facility]  Monitor: w/o Monitor  Provider&#x27;s Contact #: [phone] (05-21 @ 12:52)  docusate sodium: [Ordered as COLACE]  100 milliGRAM(s), Oral, two times a day (05-22 @ 05:01)  Echocardiogram, Adult (05-22 @ 07:20)      Allergies    No Known Allergies    Intolerances        REVIEW OF SYSTEMS:  CARDIOVASCULAR: No chest pain, palpitations, dizziness, or leg swelling; no shortness of breath     RESPIRATORY: No cough, wheezing, chills or hemoptysis; No shortness of breath    GASTROINTESTINAL: No abdominal or epigastric pain. No nausea, vomiting, or hematemesis; No diarrhea or constipation. No melena or hematochezia.    NEUROLOGICAL: No headaches, memory loss, loss of strength, numbness      PHYSICAL EXAM:  Vital Signs Last 24 Hrs  T(C): 36.8, Max: 37 (05-21 @ 22:30)  T(F): 98.3, Max: 98.6 (05-21 @ 22:30)  HR: 100 (69 - 100)  BP: 151/83 (135/82 - 151/83)  BP(mean): --  RR: 18 (18 - 19)  SpO2: 100% (99% - 100%)    GENERAL: NAD, well-groomed, well-developed  HEAD:  Atraumatic, Normocephalic  EYES: EOMI, PERRLA, conjunctiva and sclera clear  NECK: Supple, No JVD, Normal thyroid  NERVOUS SYSTEM:  Alert & Oriented X3, Good concentration;  and symmetric  CHEST/LUNG: Clear to auscultation bilaterally; No rales, rhonchi, wheezing, or rubs  HEART: S1S2 regular, prominent second heart sound, without murmur  ABDOMEN: Soft, Nontender, Nondistended; Bowel sounds present  EXTREMITIES:  2+ Peripheral Pulses, No clubbing, cyanosis, or edema      LABS:      Ca    9.7        21 May 2017 05:15        Consultant(s) Notes Reviewed:        assessment:  metastatic lung cancer    Plan:   for echocardiogram  Neuro follow up    Care Discussed with Consultants/Other Providers:  Dr De La Torre , CT Surgeon.

## 2017-05-23 DIAGNOSIS — J91.0 MALIGNANT PLEURAL EFFUSION: ICD-10-CM

## 2017-05-23 DIAGNOSIS — C34.90 MALIGNANT NEOPLASM OF UNSPECIFIED PART OF UNSPECIFIED BRONCHUS OR LUNG: ICD-10-CM

## 2017-05-23 LAB
BASOPHILS # BLD AUTO: 0 K/UL — SIGNIFICANT CHANGE UP (ref 0–0.2)
BASOPHILS NFR BLD AUTO: 0 % — SIGNIFICANT CHANGE UP (ref 0–2)
BUN SERPL-MCNC: 38 MG/DL — HIGH (ref 7–23)
CALCIUM SERPL-MCNC: 9.2 MG/DL — SIGNIFICANT CHANGE UP (ref 8.4–10.5)
CHLORIDE SERPL-SCNC: 96 MMOL/L — LOW (ref 98–107)
CO2 SERPL-SCNC: 22 MMOL/L — SIGNIFICANT CHANGE UP (ref 22–31)
CREAT SERPL-MCNC: 1.86 MG/DL — HIGH (ref 0.5–1.3)
EOSINOPHIL # BLD AUTO: 0 K/UL — SIGNIFICANT CHANGE UP (ref 0–0.5)
EOSINOPHIL NFR BLD AUTO: 0 % — SIGNIFICANT CHANGE UP (ref 0–6)
GLUCOSE SERPL-MCNC: 126 MG/DL — HIGH (ref 70–99)
HCT VFR BLD CALC: 26.1 % — LOW (ref 39–50)
HGB BLD-MCNC: 8.2 G/DL — LOW (ref 13–17)
IMM GRANULOCYTES NFR BLD AUTO: 0.8 % — SIGNIFICANT CHANGE UP (ref 0–1.5)
LYMPHOCYTES # BLD AUTO: 0.96 K/UL — LOW (ref 1–3.3)
LYMPHOCYTES # BLD AUTO: 5.7 % — LOW (ref 13–44)
MCHC RBC-ENTMCNC: 31.4 % — LOW (ref 32–36)
MCHC RBC-ENTMCNC: 31.5 PG — SIGNIFICANT CHANGE UP (ref 27–34)
MCV RBC AUTO: 100.4 FL — HIGH (ref 80–100)
MONOCYTES # BLD AUTO: 1.05 K/UL — HIGH (ref 0–0.9)
MONOCYTES NFR BLD AUTO: 6.2 % — SIGNIFICANT CHANGE UP (ref 2–14)
NEUTROPHILS # BLD AUTO: 14.85 K/UL — HIGH (ref 1.8–7.4)
NEUTROPHILS NFR BLD AUTO: 87.3 % — HIGH (ref 43–77)
PLATELET # BLD AUTO: 168 K/UL — SIGNIFICANT CHANGE UP (ref 150–400)
PMV BLD: 9.4 FL — SIGNIFICANT CHANGE UP (ref 7–13)
POTASSIUM SERPL-MCNC: 5 MMOL/L — SIGNIFICANT CHANGE UP (ref 3.5–5.3)
POTASSIUM SERPL-SCNC: 5 MMOL/L — SIGNIFICANT CHANGE UP (ref 3.5–5.3)
RBC # BLD: 2.6 M/UL — LOW (ref 4.2–5.8)
RBC # FLD: 27 % — HIGH (ref 10.3–14.5)
SODIUM SERPL-SCNC: 133 MMOL/L — LOW (ref 135–145)
WBC # BLD: 16.99 K/UL — HIGH (ref 3.8–10.5)
WBC # FLD AUTO: 16.99 K/UL — HIGH (ref 3.8–10.5)

## 2017-05-23 PROCEDURE — 93306 TTE W/DOPPLER COMPLETE: CPT | Mod: 26

## 2017-05-23 RX ADMIN — Medication 4 MILLIGRAM(S): at 06:05

## 2017-05-23 RX ADMIN — SIMETHICONE 80 MILLIGRAM(S): 80 TABLET, CHEWABLE ORAL at 18:55

## 2017-05-23 RX ADMIN — Medication 100 MILLIGRAM(S): at 18:55

## 2017-05-23 RX ADMIN — PANTOPRAZOLE SODIUM 40 MILLIGRAM(S): 20 TABLET, DELAYED RELEASE ORAL at 06:05

## 2017-05-23 RX ADMIN — Medication 4 MILLIGRAM(S): at 12:53

## 2017-05-23 RX ADMIN — Medication 4 MILLIGRAM(S): at 23:59

## 2017-05-23 RX ADMIN — SIMETHICONE 80 MILLIGRAM(S): 80 TABLET, CHEWABLE ORAL at 06:05

## 2017-05-23 RX ADMIN — Medication 100 MILLIGRAM(S): at 06:04

## 2017-05-23 RX ADMIN — AMLODIPINE BESYLATE 5 MILLIGRAM(S): 2.5 TABLET ORAL at 06:05

## 2017-05-23 RX ADMIN — Medication 4 MILLIGRAM(S): at 18:55

## 2017-05-23 NOTE — CONSULT NOTE ADULT - PROBLEM SELECTOR PROBLEM 1
Malignant neoplasm of lung, unspecified laterality, unspecified part of lung
Neoplasm of brain
Neoplasm of brain

## 2017-05-23 NOTE — OCCUPATIONAL THERAPY INITIAL EVALUATION ADULT - LIVES WITH, PROFILE
Pt. reports he lives with his fiance, daughter, and son, in an apartment building with 15 steps to enter. Once inside, pt. reports he has an elevator available to reach his apartment located on the 5th floor. Per pt., he has a bathtub in his bathroom.

## 2017-05-23 NOTE — OCCUPATIONAL THERAPY INITIAL EVALUATION ADULT - ADDITIONAL COMMENTS
(See continued from above) CT Head No Contrast on 5/20/17 displays Interval progression of hemorrhagic metastatic deposits within the supra and infratentorial compartments.

## 2017-05-23 NOTE — PROGRESS NOTE ADULT - SUBJECTIVE AND OBJECTIVE BOX
Patient is a 61y old  Male who presents with a chief complaint of I can't walk (20 May 2017 11:11)      INTERVAL HPI/OVERNIGHT EVENTS:    MEDICATIONS  (STANDING):  pantoprazole    Tablet 40milliGRAM(s) Oral before breakfast  amLODIPine   Tablet 5milliGRAM(s) Oral daily  simethicone 80milliGRAM(s) Chew two times a day  dexamethasone  Injectable 4milliGRAM(s) IV Push every 6 hours  docusate sodium 100milliGRAM(s) Oral two times a day    MEDICATIONS  (PRN):  acetaminophen   Tablet 650milliGRAM(s) Oral every 6 hours PRN For Temp greater than 38.5 C (101.3 F)  acetaminophen   Tablet. 650milliGRAM(s) Oral every 6 hours PRN mild to moderate pain  ondansetron Injectable 4milliGRAM(s) IV Push every 6 hours PRN Nausea  aluminum hydroxide/magnesium hydroxide/simethicone Suspension 30milliLiter(s) Oral every 6 hours PRN Dyspepsia        Orders last 24 hours:  Basic Metabolic Panel: AM Sched. Collection: 23-May-2017 06:00 (05-22 @ 19:03)  Complete Blood Count + Automated Diff: AM Sched. Collection: 23-May-2017 06:00 (05-22 @ 19:03)      Allergies    No Known Allergies    Intolerances        REVIEW OF SYSTEMS:  CARDIOVASCULAR: No chest pain, palpitations, dizziness, or leg swelling; no shortness of breath     RESPIRATORY: No cough, wheezing, chills or hemoptysis; No shortness of breath    GASTROINTESTINAL: No abdominal or epigastric pain. No nausea, vomiting, or hematemesis; No diarrhea or constipation. No melena or hematochezia.    NEUROLOGICAL: No headaches, memory loss, loss of strength, numbness      PHYSICAL EXAM:  Vital Signs Last 24 Hrs  T(C): 36.7, Max: 36.7 (05-22 @ 10:00)  T(F): 98, Max: 98 (05-22 @ 10:00)  HR: 65 (60 - 74)  BP: 147/92 (132/79 - 153/84)  BP(mean): --  RR: 20 (16 - 20)  SpO2: 100% (100% - 100%)           GENERAL: NAD, well-groomed, well-developed  HEAD:  Atraumatic, Normocephalic  EYES: EOMI, PERRLA, conjunctiva and sclera clear  NECK: Supple, No JVD, Normal thyroid  NERVOUS SYSTEM:  Alert & Oriented X3, Good concentration;  and symmetric  CHEST/LUNG: Clear to auscultation bilaterally; No rales, rhonchi, wheezing, or rubs  HEART: S1S2 regular, prominent second heart sound, without murmur  ABDOMEN: Soft, Nontender, Nondistended; Bowel sounds present  EXTREMITIES:  2+ Peripheral Pulses, No clubbing, cyanosis, or edema)-----------( 168      ( 23 May 2017 07:35 )             26.1     23 May 2017 07:35    133    |  96     |  38     ----------------------------<  126    5.0     |  22     |  1.86     Ca    9.2        23 May 2017 07:35        CAPILLARY BLOOD GLUCOSE    stable   Awaiting echo.  Neuro/NS follow up   D/W Dr Arias  follow blood pressure      Plan:       Care Discussed with Consultants/Other Providers:

## 2017-05-23 NOTE — OCCUPATIONAL THERAPY INITIAL EVALUATION ADULT - PLANNED THERAPY INTERVENTIONS, OT EVAL
fine motor coordination training/balance training/ADL retraining/strengthening/neuromuscular re-education/bed mobility training/transfer training/ROM

## 2017-05-23 NOTE — OCCUPATIONAL THERAPY INITIAL EVALUATION ADULT - LEVEL OF INDEPENDENCE: SIT/SUPINE, REHAB EVAL
NT; Pt. left sitting at EOB and handed off to PT present in NAD. Call bell in reach. All lines intact and all precautions maintained. FAB Williamson made aware & acknowledged.

## 2017-05-23 NOTE — OCCUPATIONAL THERAPY INITIAL EVALUATION ADULT - PERTINENT HX OF CURRENT PROBLEM, REHAB EVAL
Pt is a 60 yo M with hx of HTN, CKD stage 3, BPH, Lung CA s/p chemo, R apical lobectomy complicated by large pleural effusion s/p Pleurx catheter, subsequently discontinued, & metastasis to the brain s/p gamma knife 2.5 weeks ago, who presents to [facility] on 5/20/17 with acute onset of slurred speech, difficulty with coordination, dizziness, & generalized headache 1 week ago, progressing to the point of unable to ambulate. (See continued below)

## 2017-05-23 NOTE — OCCUPATIONAL THERAPY INITIAL EVALUATION ADULT - DIAGNOSIS, OT EVAL
r/o CVA; Hx Lung Cancer and Brain Metastasis; Decreased standing balance and ataxia; Mild L UE weakness; Decreased L UE fine-motor coordination; Decreased functional mobility; Decreased ADL management

## 2017-05-23 NOTE — OCCUPATIONAL THERAPY INITIAL EVALUATION ADULT - TRANSFER SAFETY CONCERNS NOTED: SIT/STAND, REHAB EVAL
Pt. reported dizziness standing for about 1 minute. Pt. returned to sitting at EOB. BP taken: 148/96 mmHg. FAB Williamson made aware and acknowledged. Pt. reports dizziness improved returning to sit at EOB.

## 2017-05-23 NOTE — CONSULT NOTE ADULT - CONSULT REASON
Ataxic Gait/Abnormal CT scan
CKD
Pleural effusion
Worsening neurologic exam s/p gamma knife treatment for brain metastases
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
## 2017-05-23 NOTE — PROGRESS NOTE ADULT - SUBJECTIVE AND OBJECTIVE BOX
No pain, no shortness of breath      MEDICATIONS  (STANDING):  pantoprazole    Tablet 40milliGRAM(s) Oral before breakfast  amLODIPine   Tablet 5milliGRAM(s) Oral daily  simethicone 80milliGRAM(s) Chew two times a day  dexamethasone  Injectable 4milliGRAM(s) IV Push every 6 hours  docusate sodium 100milliGRAM(s) Oral two times a day      VITAL:  T(C): , Max: 36.7 (05-22 @ 10:00)  T(F): , Max: 98 (05-22 @ 10:00)  HR: 65  BP: 147/92  BP(mean): --  RR: 20  SpO2: 100%  Wt(kg): --    I and O's:        PHYSICAL EXAM:    Constitutional: lethargic but alert; cachectic  HEENT: left ptosis; DMM  Neck: No LAD, No JVD  Respiratory: distant on R; clear on L  Cardiovascular: S1 and S2 Reg  Gastrointestinal: BS+, soft, NT/ND  Extremities: No peripheral edema      LABS:                        8.2    16.99 )-----------( 168      ( 23 May 2017 07:35 )             26.1     Na(133)/K(5.0)/Cl(96)/HCO3(22)/BUN(38)/Cr(1.86)Glu(126)/Ca(9.2)/Mg(--)/PO4(--)    05-23 @ 07:35  Na(138)/K(5.0)/Cl(101)/HCO3(22)/BUN(53)/Cr(1.93)Glu(101)/Ca(9.7)/Mg(--)/PO4(--)    05-21 @ 05:15      IMPRESSION:61M with HTN, CKD stage 3-4, and stage 4 Lung CA, 5/20/17 admitted with dysarthria, headaches, and dyspnea.     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 Stable function at present.    (2)Hyperkalemia - mild, okay for now.    (3)SOB - CXR not showing worsening of pleural effusion, s/p removal of pleurex 1 month ago. Etiology of SOB?    (4)Neuro - brain mets causing associated symptoms - Neuro and Neurosurgery on board        PLAN:    (1)Management of neurologic symptoms per primary team/Neuro/Neurosurgery  (2)Thoracic team evaluation given shortness of breath  (3)Kayexelate prn K >5.7  (4)Dose new meds for GFR 30ml/min  (5)No objection to use of Gadolinium. (+)mild shortness of breath. (+)intermittent headaches.      MEDICATIONS  (STANDING):  pantoprazole    Tablet 40milliGRAM(s) Oral before breakfast  amLODIPine   Tablet 5milliGRAM(s) Oral daily  simethicone 80milliGRAM(s) Chew two times a day  dexamethasone  Injectable 4milliGRAM(s) IV Push every 6 hours  docusate sodium 100milliGRAM(s) Oral two times a day      VITAL:  T(C): , Max: 36.7 (05-22 @ 10:00)  T(F): , Max: 98 (05-22 @ 10:00)  HR: 65  BP: 147/92  BP(mean): --  RR: 20  SpO2: 100%    PHYSICAL EXAM:    Constitutional: lethargic but alert; cachectic  HEENT: left ptosis; DMM  Neck: No LAD, No JVD  Respiratory: distant on R; clear on L  Cardiovascular: S1 and S2 Reg  Gastrointestinal: BS+, soft, NT/ND  Extremities: No peripheral edema      LABS:                        8.2    16.99 )-----------( 168      ( 23 May 2017 07:35 )             26.1     Na(133)/K(5.0)/Cl(96)/HCO3(22)/BUN(38)/Cr(1.86)Glu(126)/Ca(9.2)/Mg(--)/PO4(--)    05-23 @ 07:35  Na(138)/K(5.0)/Cl(101)/HCO3(22)/BUN(53)/Cr(1.93)Glu(101)/Ca(9.7)/Mg(--)/PO4(--)    05-21 @ 05:15      IMPRESSION:61M with HTN, CKD stage 3-4, and stage 4 Lung CA, 5/20/17 admitted with dysarthria, headaches, and dyspnea.     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 Stable function at present.    (2)Hyperkalemia - mild, okay for now.    (3)SOB - CXR not showing worsening of pleural effusion, s/p removal of pleurex 1 month ago. Etiology of SOB?    (4)Neuro - brain mets causing associated symptoms - Neuro and Neurosurgery on board        PLAN:    (1)Management of neurologic symptoms per primary team/Neuro/Neurosurgery  (2)Thoracic team evaluation given shortness of breath  (3)Kayexelate prn K >5.7  (4)Dose new meds for GFR 30ml/min  (5)No objection to use of Gadolinium. (+)mild shortness of breath. (+)intermittent headaches. (+)cough      MEDICATIONS  (STANDING):  pantoprazole    Tablet 40milliGRAM(s) Oral before breakfast  amLODIPine   Tablet 5milliGRAM(s) Oral daily  simethicone 80milliGRAM(s) Chew two times a day  dexamethasone  Injectable 4milliGRAM(s) IV Push every 6 hours  docusate sodium 100milliGRAM(s) Oral two times a day      VITAL:  T(C): , Max: 36.7 (05-22 @ 10:00)  T(F): , Max: 98 (05-22 @ 10:00)  HR: 65  BP: 147/92  BP(mean): --  RR: 20  SpO2: 100%    PHYSICAL EXAM:    Constitutional: lethargic but alert; cachectic  HEENT: left ptosis; DMM  Neck: No LAD, No JVD  Respiratory: distant on R; clear on L  Cardiovascular: S1 and S2 Reg  Gastrointestinal: BS+, soft, NT/ND  Extremities: No peripheral edema      LABS:                        8.2    16.99 )-----------( 168      ( 23 May 2017 07:35 )             26.1     Na(133)/K(5.0)/Cl(96)/HCO3(22)/BUN(38)/Cr(1.86)Glu(126)/Ca(9.2)/Mg(--)/PO4(--)    05-23 @ 07:35  Na(138)/K(5.0)/Cl(101)/HCO3(22)/BUN(53)/Cr(1.93)Glu(101)/Ca(9.7)/Mg(--)/PO4(--)    05-21 @ 05:15      IMPRESSION:61M with HTN, CKD stage 3-4, and stage 4 Lung CA, 5/20/17 admitted with dysarthria, headaches, and dyspnea.     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 Stable function at present.    (2)Hyperkalemia - mild, okay for now.    (3)SOB - CXR not showing worsening of pleural effusion, s/p removal of pleurex 1 month ago. Etiology of SOB?    (4)Neuro - brain mets causing associated symptoms - Neuro and Neurosurgery on board        PLAN:    (1)Management of neurologic symptoms per primary team/Neuro/Neurosurgery  (2)Thoracic team evaluation given shortness of breath  (3)Kayexelate prn K >5.7  (4)Dose new meds for GFR 30ml/min  (5)No objection to use of Gadolinium.  (6)prn Guaifenisen

## 2017-05-23 NOTE — OCCUPATIONAL THERAPY INITIAL EVALUATION ADULT - MD ORDER
Occupational Therapy to evaluate and treat. Occupational Therapy to evaluate and treat. OOB with assistance. Per FAB Williamson, pt. is okay to participate in OT evaluation and perform OOB activity as tolerated.

## 2017-05-24 LAB
BASOPHILS # BLD AUTO: 0 K/UL — SIGNIFICANT CHANGE UP (ref 0–0.2)
BASOPHILS NFR BLD AUTO: 0 % — SIGNIFICANT CHANGE UP (ref 0–2)
BUN SERPL-MCNC: 41 MG/DL — HIGH (ref 7–23)
CALCIUM SERPL-MCNC: 9.4 MG/DL — SIGNIFICANT CHANGE UP (ref 8.4–10.5)
CHLORIDE SERPL-SCNC: 97 MMOL/L — LOW (ref 98–107)
CO2 SERPL-SCNC: 23 MMOL/L — SIGNIFICANT CHANGE UP (ref 22–31)
CREAT SERPL-MCNC: 2.1 MG/DL — HIGH (ref 0.5–1.3)
EOSINOPHIL # BLD AUTO: 0.01 K/UL — SIGNIFICANT CHANGE UP (ref 0–0.5)
EOSINOPHIL NFR BLD AUTO: 0 % — SIGNIFICANT CHANGE UP (ref 0–6)
GLUCOSE SERPL-MCNC: 111 MG/DL — HIGH (ref 70–99)
HCT VFR BLD CALC: 28.5 % — LOW (ref 39–50)
HGB BLD-MCNC: 9 G/DL — LOW (ref 13–17)
IMM GRANULOCYTES NFR BLD AUTO: 0.7 % — SIGNIFICANT CHANGE UP (ref 0–1.5)
LYMPHOCYTES # BLD AUTO: 0.91 K/UL — LOW (ref 1–3.3)
LYMPHOCYTES # BLD AUTO: 4.3 % — LOW (ref 13–44)
MCHC RBC-ENTMCNC: 31.6 % — LOW (ref 32–36)
MCHC RBC-ENTMCNC: 31.6 PG — SIGNIFICANT CHANGE UP (ref 27–34)
MCV RBC AUTO: 100 FL — SIGNIFICANT CHANGE UP (ref 80–100)
MONOCYTES # BLD AUTO: 1.2 K/UL — HIGH (ref 0–0.9)
MONOCYTES NFR BLD AUTO: 5.6 % — SIGNIFICANT CHANGE UP (ref 2–14)
NEUTROPHILS # BLD AUTO: 19.01 K/UL — HIGH (ref 1.8–7.4)
NEUTROPHILS NFR BLD AUTO: 89.4 % — HIGH (ref 43–77)
PLATELET # BLD AUTO: 174 K/UL — SIGNIFICANT CHANGE UP (ref 150–400)
PMV BLD: 9.6 FL — SIGNIFICANT CHANGE UP (ref 7–13)
POTASSIUM SERPL-MCNC: 5 MMOL/L — SIGNIFICANT CHANGE UP (ref 3.5–5.3)
POTASSIUM SERPL-SCNC: 5 MMOL/L — SIGNIFICANT CHANGE UP (ref 3.5–5.3)
RBC # BLD: 2.85 M/UL — LOW (ref 4.2–5.8)
RBC # FLD: 26.6 % — HIGH (ref 10.3–14.5)
SODIUM SERPL-SCNC: 135 MMOL/L — SIGNIFICANT CHANGE UP (ref 135–145)
WBC # BLD: 21.28 K/UL — HIGH (ref 3.8–10.5)
WBC # FLD AUTO: 21.28 K/UL — HIGH (ref 3.8–10.5)

## 2017-05-24 RX ORDER — HYDRALAZINE HCL 50 MG
5 TABLET ORAL ONCE
Qty: 0 | Refills: 0 | Status: COMPLETED | OUTPATIENT
Start: 2017-05-24 | End: 2017-05-24

## 2017-05-24 RX ADMIN — Medication 4 MILLIGRAM(S): at 12:20

## 2017-05-24 RX ADMIN — PANTOPRAZOLE SODIUM 40 MILLIGRAM(S): 20 TABLET, DELAYED RELEASE ORAL at 05:29

## 2017-05-24 RX ADMIN — AMLODIPINE BESYLATE 5 MILLIGRAM(S): 2.5 TABLET ORAL at 05:28

## 2017-05-24 RX ADMIN — Medication 4 MILLIGRAM(S): at 05:28

## 2017-05-24 RX ADMIN — Medication 100 MILLIGRAM(S): at 05:28

## 2017-05-24 RX ADMIN — SIMETHICONE 80 MILLIGRAM(S): 80 TABLET, CHEWABLE ORAL at 18:03

## 2017-05-24 RX ADMIN — Medication 4 MILLIGRAM(S): at 18:03

## 2017-05-24 RX ADMIN — SIMETHICONE 80 MILLIGRAM(S): 80 TABLET, CHEWABLE ORAL at 05:28

## 2017-05-24 RX ADMIN — Medication 100 MILLIGRAM(S): at 18:03

## 2017-05-24 RX ADMIN — Medication 5 MILLIGRAM(S): at 00:22

## 2017-05-24 NOTE — H&P PST ADULT - NS MD HP PULSE RADIAL
Earwax Removal    The ear canal makes earwax from the canal’s lining. The ears make wax to lubricate and protect the ear canal. The ear canal is the tube that connects the middle ear to the outside of the ear.  The wax protects the ear from bacteria, infe · Don’t use cotton swabs in your ears. Cotton swabs may push wax deeper into the ear canal or damage the eardrum.  Use cotton gauze or a wet washcloth  to gently remove wax on the outside of the ear and around the opening to the ear canal.  · Don't use any © 9465-5216 [address], [address] [place]. All rights reserved. This information is not intended as a substitute for professional medical care. Always follow your healthcare professional's instructions. right normal/left normal

## 2017-05-24 NOTE — PROGRESS NOTE ADULT - SUBJECTIVE AND OBJECTIVE BOX
Patient is a 61y old  Male who presents with a chief complaint of I can't walk (20 May 2017 11:11)      INTERVAL HPI/OVERNIGHT EVENTS:    MEDICATIONS  (STANDING):  pantoprazole    Tablet 40milliGRAM(s) Oral before breakfast  amLODIPine   Tablet 5milliGRAM(s) Oral daily  simethicone 80milliGRAM(s) Chew two times a day  dexamethasone  Injectable 4milliGRAM(s) IV Push every 6 hours  docusate sodium 100milliGRAM(s) Oral two times a day    MEDICATIONS  (PRN):  acetaminophen   Tablet 650milliGRAM(s) Oral every 6 hours PRN For Temp greater than 38.5 C (101.3 F)  acetaminophen   Tablet. 650milliGRAM(s) Oral every 6 hours PRN mild to moderate pain  ondansetron Injectable 4milliGRAM(s) IV Push every 6 hours PRN Nausea  aluminum hydroxide/magnesium hydroxide/simethicone Suspension 30milliLiter(s) Oral every 6 hours PRN Dyspepsia  guaiFENesin    Syrup 200milliGRAM(s) Oral every 6 hours PRN Cough        Orders last 24 hours:  guaiFENesin    Syrup: [Known as ROBITUSSIN]  200 milliGRAM(s), Oral, every 6 hours, PRN for Cough  Provider&#x27;s Contact #: [phone] (05-23 @ 10:35)  Complete Blood Count + Automated Diff: AM Sched. Collection: 24-May-2017 06:00 (05-23 @ 17:37)  Basic Metabolic Panel: AM Sched. Collection: 24-May-2017 06:00 (05-23 @ 17:37)  hydrALAZINE: [Known as APRESOLINE]  5 milliGRAM(s), Oral, once, Stop After 1 Doses  Provider&#x27;s Contact #: [phone] (05-24 @ 00:05) [completed]      Allergies    No Known Allergies    Intolerances        REVIEW OF SYSTEMS:  CARDIOVASCULAR: No chest pain, palpitations, dizziness, or leg swelling; no shortness of breath     RESPIRATORY: No cough, wheezing, chills or hemoptysis; No shortness of breath    GASTROINTESTINAL: No abdominal or epigastric pain. No nausea, vomiting, or hematemesis; No diarrhea or constipation. No melena or hematochezia.    NEUROLOGICAL: No headaches, memory loss, loss of strength, numbness      PHYSICAL EXAM:  Vital Signs Last 24 Hrs  T(C): 36.6, Max: 36.7 (05-23 @ 18:34)  T(F): 97.8, Max: 98 (05-23 @ 18:34)  HR: 86 (70 - 86)  BP: 149/92 (137/105 - 150/88)  BP(mean): --  RR: 18 (18 - 20)  SpO2: 100% (97% - 100%)    GENERAL: NAD, well-groomed, well-developed  HEAD:  Atraumatic, Normocephalic  EYES: EOMI, PERRLA, conjunctiva and sclera clear  NECK: Supple, No JVD, Normal thyroid  NERVOUS SYSTEM:  Alert & Oriented X3, Good concentration;  and symmetric  CHEST/LUNG: Clear to auscultation bilaterally; No rales, rhonchi, wheezing, or rubs  HEART: S1S2 regular, without murmur, rub nor gallop. prominent second heart sound   ABDOMEN: Soft, Nontender, Nondistended; Bowel sounds prese  Echo:  1. Normal left ventricular internal dimensions and wall  thicknesses.  2. Mild segmental left ventricular systolic dysfunction.  The septum is hypokinetic.  3. Normal right ventricular size and function.:  2+ Peripheral Pulses, No clubbing, cyanosis, or edema      LABS:                        9.0    21.28 )-----------( 174      ( 24 May 2017 06:07 )             28.5     24 May 2017 06:07    135    |  97     |  41     ----------------------------<  111    5.0     |  23     |  2.10     Ca    9.4        24 May 2017 06:07             Consultant(s) Notes Reviewed:        assessment:  patient stable.  echo unremarkable    Plan:   continue medications; steroid taper as tolerated.     Care Discussed with Consultants/Other Providers:

## 2017-05-25 LAB
ALBUMIN SERPL ELPH-MCNC: 3.8 G/DL — SIGNIFICANT CHANGE UP (ref 3.3–5)
ALP SERPL-CCNC: 66 U/L — SIGNIFICANT CHANGE UP (ref 40–120)
ALT FLD-CCNC: 14 U/L — SIGNIFICANT CHANGE UP (ref 4–41)
AST SERPL-CCNC: 17 U/L — SIGNIFICANT CHANGE UP (ref 4–40)
BASOPHILS # BLD AUTO: 0.01 K/UL — SIGNIFICANT CHANGE UP (ref 0–0.2)
BASOPHILS NFR BLD AUTO: 0 % — SIGNIFICANT CHANGE UP (ref 0–2)
BILIRUB SERPL-MCNC: 0.2 MG/DL — SIGNIFICANT CHANGE UP (ref 0.2–1.2)
BUN SERPL-MCNC: 41 MG/DL — HIGH (ref 7–23)
CALCIUM SERPL-MCNC: 9.8 MG/DL — SIGNIFICANT CHANGE UP (ref 8.4–10.5)
CHLORIDE SERPL-SCNC: 96 MMOL/L — LOW (ref 98–107)
CO2 SERPL-SCNC: 23 MMOL/L — SIGNIFICANT CHANGE UP (ref 22–31)
CREAT SERPL-MCNC: 2.02 MG/DL — HIGH (ref 0.5–1.3)
EOSINOPHIL # BLD AUTO: 0 K/UL — SIGNIFICANT CHANGE UP (ref 0–0.5)
EOSINOPHIL NFR BLD AUTO: 0 % — SIGNIFICANT CHANGE UP (ref 0–6)
GLUCOSE SERPL-MCNC: 106 MG/DL — HIGH (ref 70–99)
HCT VFR BLD CALC: 29.5 % — LOW (ref 39–50)
HGB BLD-MCNC: 9.3 G/DL — LOW (ref 13–17)
IMM GRANULOCYTES NFR BLD AUTO: 0.6 % — SIGNIFICANT CHANGE UP (ref 0–1.5)
LYMPHOCYTES # BLD AUTO: 1.1 K/UL — SIGNIFICANT CHANGE UP (ref 1–3.3)
LYMPHOCYTES # BLD AUTO: 5.3 % — LOW (ref 13–44)
MCHC RBC-ENTMCNC: 31.5 % — LOW (ref 32–36)
MCHC RBC-ENTMCNC: 31.6 PG — SIGNIFICANT CHANGE UP (ref 27–34)
MCV RBC AUTO: 100.3 FL — HIGH (ref 80–100)
MONOCYTES # BLD AUTO: 0.48 K/UL — SIGNIFICANT CHANGE UP (ref 0–0.9)
MONOCYTES NFR BLD AUTO: 2.3 % — SIGNIFICANT CHANGE UP (ref 2–14)
NEUTROPHILS # BLD AUTO: 19.17 K/UL — HIGH (ref 1.8–7.4)
NEUTROPHILS NFR BLD AUTO: 91.8 % — HIGH (ref 43–77)
PLATELET # BLD AUTO: 155 K/UL — SIGNIFICANT CHANGE UP (ref 150–400)
PMV BLD: 9.2 FL — SIGNIFICANT CHANGE UP (ref 7–13)
POTASSIUM SERPL-MCNC: 4.7 MMOL/L — SIGNIFICANT CHANGE UP (ref 3.5–5.3)
POTASSIUM SERPL-SCNC: 4.7 MMOL/L — SIGNIFICANT CHANGE UP (ref 3.5–5.3)
PROT SERPL-MCNC: 8.3 G/DL — SIGNIFICANT CHANGE UP (ref 6–8.3)
RBC # BLD: 2.94 M/UL — LOW (ref 4.2–5.8)
RBC # FLD: 26.1 % — HIGH (ref 10.3–14.5)
SODIUM SERPL-SCNC: 137 MMOL/L — SIGNIFICANT CHANGE UP (ref 135–145)
WBC # BLD: 20.89 K/UL — HIGH (ref 3.8–10.5)
WBC # FLD AUTO: 20.89 K/UL — HIGH (ref 3.8–10.5)

## 2017-05-25 RX ADMIN — Medication 4 MILLIGRAM(S): at 05:17

## 2017-05-25 RX ADMIN — Medication 4 MILLIGRAM(S): at 18:30

## 2017-05-25 RX ADMIN — PANTOPRAZOLE SODIUM 40 MILLIGRAM(S): 20 TABLET, DELAYED RELEASE ORAL at 05:16

## 2017-05-25 RX ADMIN — Medication 4 MILLIGRAM(S): at 12:27

## 2017-05-25 RX ADMIN — Medication 4 MILLIGRAM(S): at 00:35

## 2017-05-25 RX ADMIN — AMLODIPINE BESYLATE 5 MILLIGRAM(S): 2.5 TABLET ORAL at 05:17

## 2017-05-25 RX ADMIN — Medication 100 MILLIGRAM(S): at 05:17

## 2017-05-25 RX ADMIN — SIMETHICONE 80 MILLIGRAM(S): 80 TABLET, CHEWABLE ORAL at 05:17

## 2017-05-25 RX ADMIN — Medication 100 MILLIGRAM(S): at 18:30

## 2017-05-25 RX ADMIN — SIMETHICONE 80 MILLIGRAM(S): 80 TABLET, CHEWABLE ORAL at 18:30

## 2017-05-25 NOTE — PROGRESS NOTE ADULT - SUBJECTIVE AND OBJECTIVE BOX
Patient is a 61y old  Male who presents with a chief complaint of I can't walk (20 May 2017 11:11)      INTERVAL HPI/OVERNIGHT EVENTS:none    MEDICATIONS  (STANDING):  pantoprazole    Tablet 40milliGRAM(s) Oral before breakfast  amLODIPine   Tablet 5milliGRAM(s) Oral daily  simethicone 80milliGRAM(s) Chew two times a day  dexamethasone  Injectable 4milliGRAM(s) IV Push every 6 hours  docusate sodium 100milliGRAM(s) Oral two times a day    MEDICATIONS  (PRN):  acetaminophen   Tablet 650milliGRAM(s) Oral every 6 hours PRN For Temp greater than 38.5 C (101.3 F)  acetaminophen   Tablet. 650milliGRAM(s) Oral every 6 hours PRN mild to moderate pain  ondansetron Injectable 4milliGRAM(s) IV Push every 6 hours PRN Nausea  aluminum hydroxide/magnesium hydroxide/simethicone Suspension 30milliLiter(s) Oral every 6 hours PRN Dyspepsia  guaiFENesin    Syrup 200milliGRAM(s) Oral every 6 hours PRN Cough        Orders last 24 hours:  Complete Blood Count + Automated Diff: AM Sched. Collection: 25-May-2017 06:00 (05-24 @ 09:05)  Comprehensive Metabolic Panel: AM Sched. Collection: 25-May-2017 06:00 (05-24 @ 09:05)      Allergies    No Known Allergies    Intolerances        REVIEW OF SYSTEMS:  CARDIOVASCULAR: No chest pain, palpitations, dizziness, or leg swelling; no shortness of breath     RESPIRATORY: No cough, wheezing, chills or hemoptysis; No shortness of breath    GASTROINTESTINAL: No abdominal or epigastric pain. No nausea, vomiting, or hematemesis; No diarrhea or constipation. No melena or hematochezia.    NEUROLOGICAL: No headaches, memory loss, loss of strength, numbness      PHYSICAL EXAM:  Vital Signs Last 24 Hrs  T(C): 36.7, Max: 37 (05-25 @ 02:05)  T(F): 98.1, Max: 98.6 (05-25 @ 02:05)  HR: 68 (66 - 87)  BP: 135/75 (128/88 - 149/99)  BP(mean): --  RR: 18 (17 - 20)  SpO2: 100% (99% - 100%)    GENERAL: NAD, well-groomed, well-developed  HEAD:  Atraumatic, Normocephalic  EYES: EOMI, PERRLA, conjunctiva and sclera clear  NECK: Supple, No JVD, Normal thyroid  NERVOUS SYSTEM:  Alert & Oriented X3, Good concentration;  and symmetric  CHEST/LUNG: Clear to auscultation bilaterally; No rales, rhonchi, wheezing, or rubs  HEART: S1S2 regular, prominent second heart sound  ABDOMEN: Soft, Nontender, Nondistended; Bowel sounds present  EXTREMITIES:  2+ Peripheral Pulses, No clubbing, cyanosis, or edema      LABS:                        9.3    20.89 )-----------( 155      ( 25 May 2017 05:46 )             29.5     25 May 2017 05:46    137    |  96     |  41     ----------------------------<  106    4.7     |  23     |  2.02     Ca    9.8        25 May 2017 05:46    TPro  8.3    /  Alb  3.8    /  TBili  0.2    /  DBili  x      /  AST  17     /  ALT  14     /  AlkPhos  66     25 May 2017 05:46      CAPILLARY BLOOD GLUCOSE      TELEMETRY:    RADIOLOGY & ADDITIONAL TESTS:    Imaging Personally Reviewed:  [ ] YES     Consultant(s) Notes Reviewed:        assessment:  patient stable.   leukocytosis secondary to steroids.       Plan:     discharge planning. ? Steroid taper  Care Discussed with Consultants/Other Providers:

## 2017-05-25 NOTE — PROGRESS NOTE ADULT - ASSESSMENT
the patient can continue decadron at 4mgs PO TID. He will use Outpatient PT. He will return to my office tomorrow5/26 to restart his systemic treatment with NivolumaIV

## 2017-05-25 NOTE — PROGRESS NOTE ADULT - SUBJECTIVE AND OBJECTIVE BOX
the patient still feels he has an unsteady gate. He has agreed to use PT at home.He can continue chemotherapy as an outpatient.

## 2017-05-25 NOTE — PROVIDER CONTACT NOTE (OTHER) - ASSESSMENT
Spoke with Ms. Black RN/ Spr at [facility] -[phone], she refused to accept pt for tonight. So patient has to stay in Hospital. SW to f/u tomorrow.
/105, HR 85, RR 18, O2 100% on room air. pt denies pain. no distress noted.
/105, HR 85, temp 97.5, RR 18, O2 100% on room air. pt denies pain. no distress noted.

## 2017-05-25 NOTE — PROGRESS NOTE ADULT - SUBJECTIVE AND OBJECTIVE BOX
No pain, no shortness of breath      VITAL:  T(C): , Max: 37 (05-25 @ 02:05)  T(F): , Max: 98.6 (05-25 @ 02:05)  HR: 68  BP: 135/75  BP(mean): --  RR: 18  SpO2: 100%      PHYSICAL EXAM:    Constitutional: lethargic but alert; cachectic  HEENT: left ptosis; DMM  Neck: No LAD, No JVD  Respiratory: distant on R; clear on L  Cardiovascular: S1 and S2 Reg  Gastrointestinal: BS+, soft, NT/ND  Extremities: No peripheral edema    LABS:                        9.3    20.89 )-----------( 155      ( 25 May 2017 05:46 )             29.5     Na(137)/K(4.7)/Cl(96)/HCO3(23)/BUN(41)/Cr(2.02)Glu(106)/Ca(9.8)/Mg(--)/PO4(--)    05-25 @ 05:46  Na(135)/K(5.0)/Cl(97)/HCO3(23)/BUN(41)/Cr(2.10)Glu(111)/Ca(9.4)/Mg(--)/PO4(--)    05-24 @ 06:07  Na(133)/K(5.0)/Cl(96)/HCO3(22)/BUN(38)/Cr(1.86)Glu(126)/Ca(9.2)/Mg(--)/PO4(--)    05-23 @ 07:35        IMPRESSION:61M with HTN, CKD stage 3-4, and stage 4 Lung CA, 5/20/17 admitted with dysarthria, headaches, and dyspnea.     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 Stable function at present.    (2)Lytes - okay for now    (3)SOB - CXR not showing worsening of pleural effusion, s/p removal of pleurex 1 month ago. Etiology of SOB?    (4)Neuro - brain mets causing associated symptoms - On steroid taper      PLAN:        (1)Steroid taper per primary team  (2)Reconsult as needed (+)mild shortness of breath; (+)right flank mild pain.       VITAL:  T(C): , Max: 37 (05-25 @ 02:05)  T(F): , Max: 98.6 (05-25 @ 02:05)  HR: 68  BP: 135/75  BP(mean): --  RR: 18  SpO2: 100%      PHYSICAL EXAM:    Constitutional: lethargic but alert; cachectic  HEENT: left ptosis; DMM, temporal wasting  Neck: No LAD, No JVD  Respiratory: distant on R; clear on L  Cardiovascular: S1 and S2 Reg  Gastrointestinal: BS+, soft, NT/ND  Extremities: No peripheral edema    LABS:                        9.3    20.89 )-----------( 155      ( 25 May 2017 05:46 )             29.5     Na(137)/K(4.7)/Cl(96)/HCO3(23)/BUN(41)/Cr(2.02)Glu(106)/Ca(9.8)/Mg(--)/PO4(--)    05-25 @ 05:46  Na(135)/K(5.0)/Cl(97)/HCO3(23)/BUN(41)/Cr(2.10)Glu(111)/Ca(9.4)/Mg(--)/PO4(--)    05-24 @ 06:07  Na(133)/K(5.0)/Cl(96)/HCO3(22)/BUN(38)/Cr(1.86)Glu(126)/Ca(9.2)/Mg(--)/PO4(--)    05-23 @ 07:35        IMPRESSION:61M with HTN, CKD stage 3-4, and stage 4 Lung CA, 5/20/17 admitted with dysarthria, headaches, and dyspnea.     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 Stable function at present.    (2)Lytes - okay for now    (3)Neuro - brain mets causing associated symptoms - On steroid taper      PLAN:        (1)Steroid taper per primary team  (2)Reconsult as needed

## 2017-05-26 VITALS
RESPIRATION RATE: 18 BRPM | HEART RATE: 75 BPM | SYSTOLIC BLOOD PRESSURE: 147 MMHG | TEMPERATURE: 98 F | OXYGEN SATURATION: 99 % | DIASTOLIC BLOOD PRESSURE: 99 MMHG

## 2017-05-26 RX ORDER — DEXAMETHASONE 0.5 MG/5ML
1 ELIXIR ORAL
Qty: 45 | Refills: 0 | OUTPATIENT
Start: 2017-05-26 | End: 2017-06-10

## 2017-05-26 RX ADMIN — Medication 4 MILLIGRAM(S): at 05:56

## 2017-05-26 RX ADMIN — Medication 4 MILLIGRAM(S): at 12:11

## 2017-05-26 RX ADMIN — Medication 100 MILLIGRAM(S): at 06:01

## 2017-05-26 RX ADMIN — SIMETHICONE 80 MILLIGRAM(S): 80 TABLET, CHEWABLE ORAL at 05:56

## 2017-05-26 RX ADMIN — AMLODIPINE BESYLATE 5 MILLIGRAM(S): 2.5 TABLET ORAL at 05:56

## 2017-05-26 RX ADMIN — Medication 4 MILLIGRAM(S): at 00:41

## 2017-05-26 RX ADMIN — PANTOPRAZOLE SODIUM 40 MILLIGRAM(S): 20 TABLET, DELAYED RELEASE ORAL at 05:56

## 2017-05-26 NOTE — DISCHARGE NOTE ADULT - PATIENT PORTAL LINK FT
“You can access the [facility] Patient Portal, offered by [facility], by registering with the following website: [email or web address]”

## 2017-05-26 NOTE — DISCHARGE NOTE ADULT - CARE PROVIDER_API CALL
Paul Lee), Internal Medicine  [address] [place] [address]  Phone: [phone]  Fax: [phone]

## 2017-05-26 NOTE — PROGRESS NOTE ADULT - SUBJECTIVE AND OBJECTIVE BOX
Patient is a 61y old  Male who presents with a chief complaint of I can't walk (26 May 2017 05:19)      INTERVAL HPI/OVERNIGHT EVENTS:    MEDICATIONS  (STANDING):  pantoprazole    Tablet 40milliGRAM(s) Oral before breakfast  amLODIPine   Tablet 5milliGRAM(s) Oral daily  simethicone 80milliGRAM(s) Chew two times a day  dexamethasone  Injectable 4milliGRAM(s) IV Push every 6 hours  docusate sodium 100milliGRAM(s) Oral two times a day    MEDICATIONS  (PRN):  acetaminophen   Tablet 650milliGRAM(s) Oral every 6 hours PRN For Temp greater than 38.5 C (101.3 F)  acetaminophen   Tablet. 650milliGRAM(s) Oral every 6 hours PRN mild to moderate pain  ondansetron Injectable 4milliGRAM(s) IV Push every 6 hours PRN Nausea  aluminum hydroxide/magnesium hydroxide/simethicone Suspension 30milliLiter(s) Oral every 6 hours PRN Dyspepsia  guaiFENesin    Syrup 200milliGRAM(s) Oral every 6 hours PRN Cough        Orders last 24 hours:      Allergies    No Known Allergies    Intolerances        REVIEW OF SYSTEMS:  CARDIOVASCULAR: No chest pain, palpitations, dizziness, or leg swelling; no shortness of breath     RESPIRATORY: No cough, wheezing, chills or hemoptysis; No shortness of breath    GASTROINTESTINAL: No abdominal or epigastric pain. No nausea, vomiting, or hematemesis; No diarrhea or constipation. No melena or hematochezia.    NEUROLOGICAL: No headaches, memory loss, loss of strength, numbness      PHYSICAL EXAM:  Vital Signs Last 24 Hrs  T(C): 36.8, Max: 36.8 (05-26 @ 05:47)  T(F): 98.2, Max: 98.2 (05-26 @ 05:47)  HR: 65 (63 - 75)  BP: 131/92 (131/91 - 156/107)  BP(mean): --  RR: 17 (16 - 18)  SpO2: 97% (96% - 100%)    GENERAL: NAD, well-groomed, well-developed  HEAD:  Atraumatic, Normocephalic  EYES: EOMI, PERRLA, conjunctiva and sclera clear  NECK: Supple, No JVD, Normal thyroid  NERVOUS SYSTEM:  Alert & Oriented X3, Good concentration;  and symmetric  CHEST/LUNG: [id number] breath sounds right base  HEART: S1S2 regular, without murmur, rub nor gallop  ABDOMEN: Soft, Nontender, Nondistended; Bowel sounds present  EXTREMITIES:  2+ Peripheral Pulses, No clubbing, cyanosis, or edema      LABS:      Ca    9.8        25 May 2017 05:46            assessment:  patient stable.  Awaitng rehab placement    Plan:       Care Discussed with Consultants/Other Providers:  Dr Brown

## 2017-05-26 NOTE — DISCHARGE NOTE ADULT - ADDITIONAL INSTRUCTIONS
for recs per neuro surg       Out pt chemo appointment made for tuesday 30th at 10:40am ------------------------ Outpatient chemo appointment made for Tuesday May 30th at 10:40am

## 2017-05-26 NOTE — DISCHARGE NOTE ADULT - PLAN OF CARE
continue with home physical therapy Continue with decadron at 4mg PO TID Please follow up with Dr. Lee in his office within 2-3 days upon discharge to restart systemic treatment. Continue outpatient PT. Follow up with Dr. Lee within 2-3 days upon discharge

## 2017-05-26 NOTE — DISCHARGE NOTE ADULT - HOSPITAL COURSE
61M acute hemorrhagic lesions causing ataxia, aphasia from brain metastasis from known lung CA complicated by CKD stage 3, HTN, and dyspepsia. 61M acute hemorrhagic lesions causing ataxia, aphasia from brain metastasis from known lung CA complicated by CKD stage 3, HTN, and dyspepsia.    Neoplasm of brain  -Appreciate Neurosurgery and Neurology input.    -Will continue Decadron 4mg IV q6hr.   -MRI of brain without contrast - increased associated vasogenic edema     Ataxia  -OOB only with assist.  Monitor progress. PT eval once clinically indicated.     Malignant Pleural effusion  - CT surgery consulted   - No current indication for drainage as CXR is unchanged from 3/17 when pleurex was removed and pateint is currently asymptomatic  - Will follow         Stage 3 chronic kidney disease  -Gentle IVF hydration. Monitor Crt.     SOB  -CT surgery consulted - No current indication for drainage as CXR is unchanged from 3/17 when pleurex was removed and pateint is currently asymptomatic    5/26 - awaiting rehab placement, otherwise, medically cleared 61M acute hemorrhagic lesions causing ataxia, aphasia from brain metastasis from known lung CA complicated by CKD stage 3, HTN, and dyspepsia.    Neoplasm of brain  -Appreciate Neurosurgery and Neurology input.    -Will continue Decadron 4mg IV q6hr.   -MRI of brain without contrast - increased associated vasogenic edema     Ataxia  -OOB only with assist.  Monitor progress. PT eval once clinically indicated.     Malignant Pleural effusion  - CT surgery consulted   - No current indication for drainage as CXR is unchanged from 3/17 when pleurex was removed and pateint is currently asymptomatic  - Will follow         Stage 3 chronic kidney disease  -Gentle IVF hydration. Monitor Crt.     SOB  -CT surgery consulted - No current indication for drainage as CXR is unchanged from 3/17 when pleurex was removed and pateint is currently asymptomatic    5/26 - rehab placed,  at 3pm - to continue on DECADRON 4MG TID VIA OUTPATIENT and to follow up at [facility]

## 2017-05-26 NOTE — DISCHARGE NOTE ADULT - MEDICATION SUMMARY - MEDICATIONS TO CHANGE
I will SWITCH the dose or number of times a day I take the medications listed below when I get home from the hospital:    dexamethasone 2 mg oral tablet  -- 1 tab(s) by mouth every 8 hours  -- It is very important that you take or use this exactly as directed.  Do not skip doses or discontinue unless directed by your doctor.  Obtain medical advice before taking any non-prescription drugs as some may affect the action of this medication.  Take with food or milk.

## 2017-05-26 NOTE — DISCHARGE NOTE ADULT - COMMUNITY RESOURCES
Patient is being discharged to [facility] today at 3pm and being transported via [facility].

## 2017-05-26 NOTE — DISCHARGE NOTE ADULT - MEDICATION SUMMARY - MEDICATIONS TO TAKE
I will START or STAY ON the medications listed below when I get home from the hospital:    dexamethasone 4 mg oral tablet  -- 1 tab(s) by mouth 3 times a day  -- It is very important that you take or use this exactly as directed.  Do not skip doses or discontinue unless directed by your doctor.  Obtain medical advice before taking any non-prescription drugs as some may affect the action of this medication.  Take with food or milk.    -- Indication: For Prophylaxis    amLODIPine 5 mg oral tablet  -- 1 tab(s) by mouth once a day  -- Indication: For Prophylaxis    pantoprazole 40 mg oral delayed release tablet  -- 1 tab(s) by mouth once a day (before a meal)  -- Indication: For Prophylaxis

## 2017-06-02 NOTE — ED PROVIDER NOTE - ATTESTATION, MLM
I have reviewed and confirmed nurses' notes for patient's medications, allergies, medical history, and surgical history.
none

## 2017-06-07 ENCOUNTER — TRANSCRIPTION ENCOUNTER (OUTPATIENT)
Age: 62
End: 2017-06-07

## 2017-06-07 ENCOUNTER — INPATIENT (INPATIENT)
Facility: HOSPITAL | Age: 62
LOS: 5 days | End: 2017-06-13
Attending: STUDENT IN AN ORGANIZED HEALTH CARE EDUCATION/TRAINING PROGRAM | Admitting: STUDENT IN AN ORGANIZED HEALTH CARE EDUCATION/TRAINING PROGRAM
Payer: MEDICAID

## 2017-06-07 VITALS
HEART RATE: 98 BPM | TEMPERATURE: 98 F | OXYGEN SATURATION: 100 % | SYSTOLIC BLOOD PRESSURE: 148 MMHG | RESPIRATION RATE: 16 BRPM | DIASTOLIC BLOOD PRESSURE: 102 MMHG

## 2017-06-07 DIAGNOSIS — R41.82 ALTERED MENTAL STATUS, UNSPECIFIED: ICD-10-CM

## 2017-06-07 DIAGNOSIS — Z98.89 OTHER SPECIFIED POSTPROCEDURAL STATES: Chronic | ICD-10-CM

## 2017-06-07 DIAGNOSIS — G91.9 HYDROCEPHALUS, UNSPECIFIED: ICD-10-CM

## 2017-06-07 DIAGNOSIS — Z90.89 ACQUIRED ABSENCE OF OTHER ORGANS: Chronic | ICD-10-CM

## 2017-06-07 DIAGNOSIS — C34.90 MALIGNANT NEOPLASM OF UNSPECIFIED PART OF UNSPECIFIED BRONCHUS OR LUNG: ICD-10-CM

## 2017-06-07 DIAGNOSIS — I10 ESSENTIAL (PRIMARY) HYPERTENSION: ICD-10-CM

## 2017-06-07 DIAGNOSIS — C79.31 SECONDARY MALIGNANT NEOPLASM OF BRAIN: ICD-10-CM

## 2017-06-07 DIAGNOSIS — Z90.49 ACQUIRED ABSENCE OF OTHER SPECIFIED PARTS OF DIGESTIVE TRACT: Chronic | ICD-10-CM

## 2017-06-07 DIAGNOSIS — Z90.2 ACQUIRED ABSENCE OF LUNG [PART OF]: Chronic | ICD-10-CM

## 2017-06-07 LAB
ALBUMIN SERPL ELPH-MCNC: 4 G/DL — SIGNIFICANT CHANGE UP (ref 3.3–5)
ALP SERPL-CCNC: 68 U/L — SIGNIFICANT CHANGE UP (ref 40–120)
ALT FLD-CCNC: 21 U/L — SIGNIFICANT CHANGE UP (ref 4–41)
APPEARANCE UR: CLEAR — SIGNIFICANT CHANGE UP
APTT BLD: 22.7 SEC — LOW (ref 27.5–37.4)
AST SERPL-CCNC: 21 U/L — SIGNIFICANT CHANGE UP (ref 4–40)
BASE EXCESS BLDV CALC-SCNC: 4.6 MMOL/L — SIGNIFICANT CHANGE UP
BASOPHILS # BLD AUTO: 0 K/UL — SIGNIFICANT CHANGE UP (ref 0–0.2)
BASOPHILS # BLD AUTO: 0 K/UL — SIGNIFICANT CHANGE UP (ref 0–0.2)
BASOPHILS NFR BLD AUTO: 0 % — SIGNIFICANT CHANGE UP (ref 0–2)
BASOPHILS NFR BLD AUTO: 0 % — SIGNIFICANT CHANGE UP (ref 0–2)
BILIRUB SERPL-MCNC: 0.5 MG/DL — SIGNIFICANT CHANGE UP (ref 0.2–1.2)
BILIRUB UR-MCNC: NEGATIVE — SIGNIFICANT CHANGE UP
BLD GP AB SCN SERPL QL: NEGATIVE — SIGNIFICANT CHANGE UP
BLOOD GAS VENOUS - CREATININE: 1.85 MG/DL — HIGH (ref 0.5–1.3)
BLOOD UR QL VISUAL: NEGATIVE — SIGNIFICANT CHANGE UP
BUN SERPL-MCNC: 37 MG/DL — HIGH (ref 7–23)
CALCIUM SERPL-MCNC: 9.6 MG/DL — SIGNIFICANT CHANGE UP (ref 8.4–10.5)
CHLORIDE BLDV-SCNC: 100 MMOL/L — SIGNIFICANT CHANGE UP (ref 96–108)
CHLORIDE SERPL-SCNC: 99 MMOL/L — SIGNIFICANT CHANGE UP (ref 98–107)
CLARITY CSF: CLEAR — SIGNIFICANT CHANGE UP
CO2 SERPL-SCNC: 25 MMOL/L — SIGNIFICANT CHANGE UP (ref 22–31)
COLOR CSF: COLORLESS — SIGNIFICANT CHANGE UP
COLOR SPEC: SIGNIFICANT CHANGE UP
CREAT SERPL-MCNC: 1.88 MG/DL — HIGH (ref 0.5–1.3)
EOSINOPHIL # BLD AUTO: 0 K/UL — SIGNIFICANT CHANGE UP (ref 0–0.5)
EOSINOPHIL # BLD AUTO: 0.01 K/UL — SIGNIFICANT CHANGE UP (ref 0–0.5)
EOSINOPHIL NFR BLD AUTO: 0 % — SIGNIFICANT CHANGE UP (ref 0–6)
EOSINOPHIL NFR BLD AUTO: 0.1 % — SIGNIFICANT CHANGE UP (ref 0–6)
GAS PNL BLDV: 140 MMOL/L — SIGNIFICANT CHANGE UP (ref 136–146)
GLUCOSE BLDV-MCNC: 104 — HIGH (ref 70–99)
GLUCOSE CSF-MCNC: 77 MG/DL — HIGH (ref 40–70)
GLUCOSE SERPL-MCNC: 101 MG/DL — HIGH (ref 70–99)
GLUCOSE UR-MCNC: NEGATIVE — SIGNIFICANT CHANGE UP
GRAM STN CSF: SIGNIFICANT CHANGE UP
HCO3 BLDV-SCNC: 27 MMOL/L — SIGNIFICANT CHANGE UP (ref 20–27)
HCT VFR BLD CALC: 29.8 % — LOW (ref 39–50)
HCT VFR BLD CALC: 39.2 % — SIGNIFICANT CHANGE UP (ref 39–50)
HCT VFR BLDV CALC: 37.4 % — LOW (ref 39–51)
HGB BLD-MCNC: 12.2 G/DL — LOW (ref 13–17)
HGB BLD-MCNC: 9.4 G/DL — LOW (ref 13–17)
HGB BLDV-MCNC: 12.1 G/DL — LOW (ref 13–17)
IMM GRANULOCYTES NFR BLD AUTO: 0.2 % — SIGNIFICANT CHANGE UP (ref 0–1.5)
IMM GRANULOCYTES NFR BLD AUTO: 0.3 % — SIGNIFICANT CHANGE UP (ref 0–1.5)
INR BLD: 0.95 — SIGNIFICANT CHANGE UP (ref 0.88–1.17)
KETONES UR-MCNC: NEGATIVE — SIGNIFICANT CHANGE UP
LACTATE BLDV-MCNC: 1.8 MMOL/L — SIGNIFICANT CHANGE UP (ref 0.5–2)
LEUKOCYTE ESTERASE UR-ACNC: NEGATIVE — SIGNIFICANT CHANGE UP
LG PLATELETS BLD QL AUTO: SLIGHT — SIGNIFICANT CHANGE UP
LYMPHOCYTES # BLD AUTO: 0.32 K/UL — LOW (ref 1–3.3)
LYMPHOCYTES # BLD AUTO: 0.44 K/UL — LOW (ref 1–3.3)
LYMPHOCYTES # BLD AUTO: 4.2 % — LOW (ref 13–44)
LYMPHOCYTES # BLD AUTO: 5 % — LOW (ref 13–44)
LYMPHOCYTES # CSF: 30 % — SIGNIFICANT CHANGE UP
MACROCYTES BLD QL: SIGNIFICANT CHANGE UP
MANUAL SMEAR VERIFICATION: SIGNIFICANT CHANGE UP
MCHC RBC-ENTMCNC: 31.1 % — LOW (ref 32–36)
MCHC RBC-ENTMCNC: 31.5 % — LOW (ref 32–36)
MCHC RBC-ENTMCNC: 32.5 PG — SIGNIFICANT CHANGE UP (ref 27–34)
MCHC RBC-ENTMCNC: 32.8 PG — SIGNIFICANT CHANGE UP (ref 27–34)
MCV RBC AUTO: 103.8 FL — HIGH (ref 80–100)
MCV RBC AUTO: 104.5 FL — HIGH (ref 80–100)
MONOCYTES # BLD AUTO: 0.14 K/UL — SIGNIFICANT CHANGE UP (ref 0–0.9)
MONOCYTES # BLD AUTO: 0.24 K/UL — SIGNIFICANT CHANGE UP (ref 0–0.9)
MONOCYTES # CSF: 40 % — SIGNIFICANT CHANGE UP
MONOCYTES NFR BLD AUTO: 1.6 % — LOW (ref 2–14)
MONOCYTES NFR BLD AUTO: 3.2 % — SIGNIFICANT CHANGE UP (ref 2–14)
MUCOUS THREADS # UR AUTO: SIGNIFICANT CHANGE UP
NEUTROPHILS # BLD AUTO: 6.99 K/UL — SIGNIFICANT CHANGE UP (ref 1.8–7.4)
NEUTROPHILS # BLD AUTO: 8.17 K/UL — HIGH (ref 1.8–7.4)
NEUTROPHILS NFR BLD AUTO: 92.2 % — HIGH (ref 43–77)
NEUTROPHILS NFR BLD AUTO: 93.2 % — HIGH (ref 43–77)
NEUTS SEG NFR CSF MANUAL: 30 % — SIGNIFICANT CHANGE UP
NITRITE UR-MCNC: NEGATIVE — SIGNIFICANT CHANGE UP
NRBC NFR CSF: 2 CELL/UL — SIGNIFICANT CHANGE UP (ref 0–5)
PCO2 BLDV: 52 MMHG — HIGH (ref 41–51)
PH BLDV: 7.37 PH — SIGNIFICANT CHANGE UP (ref 7.32–7.43)
PH UR: 7 — SIGNIFICANT CHANGE UP (ref 4.6–8)
PLATELET # BLD AUTO: 194 K/UL — SIGNIFICANT CHANGE UP (ref 150–400)
PLATELET # BLD AUTO: 60 K/UL — LOW (ref 150–400)
PLATELET COUNT - ESTIMATE: SIGNIFICANT CHANGE UP
PMV BLD: 9.2 FL — SIGNIFICANT CHANGE UP (ref 7–13)
PMV BLD: 9.4 FL — SIGNIFICANT CHANGE UP (ref 7–13)
PO2 BLDV: 29 MMHG — LOW (ref 35–40)
POTASSIUM BLDV-SCNC: 4.8 MMOL/L — HIGH (ref 3.4–4.5)
POTASSIUM SERPL-MCNC: 4.9 MMOL/L — SIGNIFICANT CHANGE UP (ref 3.5–5.3)
POTASSIUM SERPL-SCNC: 4.9 MMOL/L — SIGNIFICANT CHANGE UP (ref 3.5–5.3)
PROT CSF-MCNC: 13.1 MG/DL — LOW (ref 15–45)
PROT SERPL-MCNC: 8.4 G/DL — HIGH (ref 6–8.3)
PROT UR-MCNC: 30 — SIGNIFICANT CHANGE UP
PROTHROM AB SERPL-ACNC: 10.6 SEC — SIGNIFICANT CHANGE UP (ref 9.8–13.1)
RBC # BLD: 2.87 M/UL — LOW (ref 4.2–5.8)
RBC # BLD: 3.75 M/UL — LOW (ref 4.2–5.8)
RBC # CSF: 455 CELL/UL — HIGH (ref 0–0)
RBC # FLD: 19.1 % — HIGH (ref 10.3–14.5)
RBC # FLD: 19.2 % — HIGH (ref 10.3–14.5)
RBC CASTS # UR COMP ASSIST: SIGNIFICANT CHANGE UP (ref 0–?)
RH IG SCN BLD-IMP: POSITIVE — SIGNIFICANT CHANGE UP
SAO2 % BLDV: 40.2 % — LOW (ref 60–85)
SODIUM SERPL-SCNC: 140 MMOL/L — SIGNIFICANT CHANGE UP (ref 135–145)
SP GR SPEC: 1.01 — SIGNIFICANT CHANGE UP (ref 1–1.03)
SPECIMEN SOURCE: SIGNIFICANT CHANGE UP
TOTAL CELLS COUNTED, SPINAL FLUID: 10 CELLS — SIGNIFICANT CHANGE UP
UROBILINOGEN FLD QL: NORMAL E.U. — SIGNIFICANT CHANGE UP (ref 0.1–0.2)
WBC # BLD: 7.58 K/UL — SIGNIFICANT CHANGE UP (ref 3.8–10.5)
WBC # BLD: 8.77 K/UL — SIGNIFICANT CHANGE UP (ref 3.8–10.5)
WBC # FLD AUTO: 7.58 K/UL — SIGNIFICANT CHANGE UP (ref 3.8–10.5)
WBC # FLD AUTO: 8.77 K/UL — SIGNIFICANT CHANGE UP (ref 3.8–10.5)
XANTHOCHROMIA: SIGNIFICANT CHANGE UP

## 2017-06-07 PROCEDURE — 61781 SCAN PROC CRANIAL INTRA: CPT

## 2017-06-07 PROCEDURE — 62223 ESTABLISH BRAIN CAVITY SHUNT: CPT | Mod: 79

## 2017-06-07 PROCEDURE — 71010: CPT | Mod: 26

## 2017-06-07 PROCEDURE — 99223 1ST HOSP IP/OBS HIGH 75: CPT | Mod: 25

## 2017-06-07 PROCEDURE — 70450 CT HEAD/BRAIN W/O DYE: CPT | Mod: 26

## 2017-06-07 RX ORDER — GLUCAGON INJECTION, SOLUTION 0.5 MG/.1ML
1 INJECTION, SOLUTION SUBCUTANEOUS ONCE
Qty: 0 | Refills: 0 | Status: DISCONTINUED | OUTPATIENT
Start: 2017-06-07 | End: 2017-06-08

## 2017-06-07 RX ORDER — DEXTROSE 50 % IN WATER 50 %
25 SYRINGE (ML) INTRAVENOUS ONCE
Qty: 0 | Refills: 0 | Status: DISCONTINUED | OUTPATIENT
Start: 2017-06-07 | End: 2017-06-08

## 2017-06-07 RX ORDER — SODIUM CHLORIDE 9 MG/ML
1000 INJECTION, SOLUTION INTRAVENOUS
Qty: 0 | Refills: 0 | Status: DISCONTINUED | OUTPATIENT
Start: 2017-06-07 | End: 2017-06-08

## 2017-06-07 RX ORDER — FENTANYL CITRATE 50 UG/ML
25 INJECTION INTRAVENOUS
Qty: 0 | Refills: 0 | Status: DISCONTINUED | OUTPATIENT
Start: 2017-06-07 | End: 2017-06-08

## 2017-06-07 RX ORDER — CEFAZOLIN SODIUM 1 G
2000 VIAL (EA) INJECTION EVERY 8 HOURS
Qty: 0 | Refills: 0 | Status: DISCONTINUED | OUTPATIENT
Start: 2017-06-07 | End: 2017-06-08

## 2017-06-07 RX ORDER — PANTOPRAZOLE SODIUM 20 MG/1
40 TABLET, DELAYED RELEASE ORAL
Qty: 0 | Refills: 0 | Status: DISCONTINUED | OUTPATIENT
Start: 2017-06-07 | End: 2017-06-08

## 2017-06-07 RX ORDER — DEXAMETHASONE 0.5 MG/5ML
8 ELIXIR ORAL EVERY 8 HOURS
Qty: 0 | Refills: 0 | Status: DISCONTINUED | OUTPATIENT
Start: 2017-06-07 | End: 2017-06-08

## 2017-06-07 RX ORDER — ACETAMINOPHEN 500 MG
650 TABLET ORAL EVERY 6 HOURS
Qty: 0 | Refills: 0 | Status: DISCONTINUED | OUTPATIENT
Start: 2017-06-07 | End: 2017-06-08

## 2017-06-07 RX ORDER — DEXTROSE 50 % IN WATER 50 %
12.5 SYRINGE (ML) INTRAVENOUS ONCE
Qty: 0 | Refills: 0 | Status: DISCONTINUED | OUTPATIENT
Start: 2017-06-07 | End: 2017-06-08

## 2017-06-07 RX ORDER — DOCUSATE SODIUM 100 MG
100 CAPSULE ORAL THREE TIMES A DAY
Qty: 0 | Refills: 0 | Status: DISCONTINUED | OUTPATIENT
Start: 2017-06-07 | End: 2017-06-09

## 2017-06-07 RX ORDER — SODIUM CHLORIDE 9 MG/ML
1000 INJECTION INTRAMUSCULAR; INTRAVENOUS; SUBCUTANEOUS
Qty: 0 | Refills: 0 | Status: DISCONTINUED | OUTPATIENT
Start: 2017-06-07 | End: 2017-06-07

## 2017-06-07 RX ORDER — AMLODIPINE BESYLATE 2.5 MG/1
5 TABLET ORAL DAILY
Qty: 0 | Refills: 0 | Status: DISCONTINUED | OUTPATIENT
Start: 2017-06-07 | End: 2017-06-08

## 2017-06-07 RX ORDER — DEXTROSE MONOHYDRATE, SODIUM CHLORIDE, AND POTASSIUM CHLORIDE 50; .745; 4.5 G/1000ML; G/1000ML; G/1000ML
1000 INJECTION, SOLUTION INTRAVENOUS
Qty: 0 | Refills: 0 | Status: DISCONTINUED | OUTPATIENT
Start: 2017-06-07 | End: 2017-06-08

## 2017-06-07 RX ORDER — DEXTROSE 50 % IN WATER 50 %
1 SYRINGE (ML) INTRAVENOUS ONCE
Qty: 0 | Refills: 0 | Status: DISCONTINUED | OUTPATIENT
Start: 2017-06-07 | End: 2017-06-08

## 2017-06-07 RX ORDER — HYDROMORPHONE HYDROCHLORIDE 2 MG/ML
0.2 INJECTION INTRAMUSCULAR; INTRAVENOUS; SUBCUTANEOUS
Qty: 0 | Refills: 0 | Status: DISCONTINUED | OUTPATIENT
Start: 2017-06-07 | End: 2017-06-08

## 2017-06-07 RX ORDER — INSULIN LISPRO 100/ML
VIAL (ML) SUBCUTANEOUS
Qty: 0 | Refills: 0 | Status: DISCONTINUED | OUTPATIENT
Start: 2017-06-07 | End: 2017-06-08

## 2017-06-07 RX ORDER — DEXAMETHASONE 0.5 MG/5ML
10 ELIXIR ORAL ONCE
Qty: 0 | Refills: 0 | Status: COMPLETED | OUTPATIENT
Start: 2017-06-07 | End: 2017-06-07

## 2017-06-07 RX ORDER — LABETALOL HCL 100 MG
10 TABLET ORAL
Qty: 0 | Refills: 0 | Status: DISCONTINUED | OUTPATIENT
Start: 2017-06-07 | End: 2017-06-08

## 2017-06-07 RX ORDER — HYDROMORPHONE HYDROCHLORIDE 2 MG/ML
0.4 INJECTION INTRAMUSCULAR; INTRAVENOUS; SUBCUTANEOUS
Qty: 0 | Refills: 0 | Status: DISCONTINUED | OUTPATIENT
Start: 2017-06-07 | End: 2017-06-08

## 2017-06-07 RX ORDER — INSULIN LISPRO 100/ML
VIAL (ML) SUBCUTANEOUS AT BEDTIME
Qty: 0 | Refills: 0 | Status: DISCONTINUED | OUTPATIENT
Start: 2017-06-07 | End: 2017-06-08

## 2017-06-07 RX ADMIN — Medication 102 MILLIGRAM(S): at 15:31

## 2017-06-07 RX ADMIN — DEXTROSE MONOHYDRATE, SODIUM CHLORIDE, AND POTASSIUM CHLORIDE 125 MILLILITER(S): 50; .745; 4.5 INJECTION, SOLUTION INTRAVENOUS at 21:45

## 2017-06-07 NOTE — BRIEF OPERATIVE NOTE - OPERATION/FINDINGS
Laparoscopic placement of  shunt
laparoscopic assisted stereotactic placement of ventriculoperitoneal shunt  Certas @ 4
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
## 2017-06-07 NOTE — ED PROVIDER NOTE - ST/T WAVE
heightened T waves V3-V5 slight increase in T wave height in V2 and V5 without evidence of ischemia stable no acute change

## 2017-06-07 NOTE — H&P ADULT - NSHPPHYSICALEXAM_GEN_ALL_CORE
Awake, lethargic, oriented x 2, minimal verbal, follows simple commands only,  Right pupil 3mm reactive, blind in left eye  Motor- STOUT spontaneously, antigravity x 4  Sensory- reacts to noxious stimuli

## 2017-06-07 NOTE — ED ADULT TRIAGE NOTE - CHIEF COMPLAINT QUOTE
Hx of lung CA, on chemo, blind in left eye, deaf in left ear,  presents from PCP for deteriorating mental status and weakness. Pt denies pain. Resp even and unlabored.
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
## 2017-06-07 NOTE — ED PROVIDER NOTE - OBJECTIVE STATEMENT
60 y/o m h/o HTN, BPH, left eye blindness, left ear deafness, lung CA with metastasis to brain follows Hem/Onc Paul Lee brought in via EMS after routine follow up appt with Dr. Lee. As per aide from [facility] in [place], Dr. Lee felt he made a drastic decline from 2 weeks ago noting change in ambulation and slurred speech.  The aid cannot tell me onset of symptoms. As per pt who is answering questions and responding to commands, states he "feels the need to pee but cannot go". Answers to questions delayed, mental cognition slowed. Does not appear in distress. 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 Lee [phone] . As per aide from [facility] in [place], Dr. Lee felt he made a drastic decline from 2 weeks ago noting change in ambulation and slurred speech.  The aid cannot tell me onset of symptoms. As per pt who is answering questions and responding to commands, states he "feels the need to pee but cannot go". Answers to questions delayed, mental cognition slowed. Does not appear in distress.

## 2017-06-07 NOTE — ED PROVIDER NOTE - ATTENDING CONTRIBUTION TO CARE
Dr. Hoskins: 61M h/o HTN, lung cancer w known brain mets s/p chemo and radiation presents from clinic with lethargy and AMS. Symptoms noted during routine oncology visit today.  From rehab, no report of fever, no n/v.  No HA.  Patient is lethargic but arousable, a&o x2, R pupil round and reactive, L pupil surgical, follows simple commands, MAEE, rrr, lungs clear, abd soft, no rash, no edema.  CT with enlarging mets, ? internal hemorrhage, + new hydrocephalus from compression of 4th ventricle.  NSG c/s, given decadron, will need drain vs shunt, admit to SICU.

## 2017-06-07 NOTE — ED ADULT NURSE NOTE - OBJECTIVE STATEMENT
Rec'd 60 YO M in rm 4, sent by PCP for AMS, general decline. Pt currently on chemo for lung ca. Pt lethargic but awakens to verbal stimuli. NAD, respirations unlabored. skin W/D/I. IV accessed and labs sent. Paula attempted but won't advance; PA aware. Will monitor. RF Rec'd 60 YO M in rm 4, A&OX2, sent by PCP for AMS, general decline. Pt currently on chemo for lung ca. Pt lethargic but awakens to verbal stimuli. NAD, respirations unlabored. skin W/D/I. IV accessed and labs sent. Paula attempted but won't advance; PA aware. Will monitor. RF

## 2017-06-07 NOTE — PROCEDURE NOTE - NSURITECHNIQUE_GU_A_CORE
The catheter was secured with a securement device (e.g. StatLock)/The collection bag is below the level of the patient and urinary bladder/Sterile gloves were worn for the duration of the procedure/A sterile drape was used to cover all adjacent areas/The site was cleaned with soap/water and sterile solution (betadine)/Proper hand hygiene was performed/The catheter was appropriately lubricated

## 2017-06-07 NOTE — ED ADULT NURSE NOTE - ED STAT RN HANDOFF DETAILS
Pt sent to OR with platlet infusion.  Pt brought by MD Deng resident.  Pt's son at the bedside (Session, Donald Palma) signed the surgery consent. Handoff given to FAB Castillo in OR.

## 2017-06-07 NOTE — H&P ADULT - PROBLEM SELECTOR PLAN 1
1.NPO with IVF  2. Pre-op for OR, labs,  BMP, coags, T/S  3. platelet count 60, transfuse 1 unit single donor platelets  4. Stereo CT head stat  5. Decadron 10mg IV stat  6. patients wife contacted and agrees to go ahead with VPS  7. SICU consult, neurochecks Q 1H

## 2017-06-07 NOTE — ED PROVIDER NOTE - PROGRESS NOTE DETAILS
PA Tiberio- Peaked T Waves noted V2-V4 on EKG. Repeat ordered for 10 minutes.  VSS. LORA Albert- EKG's repeated twice s/p initial EKG with no acute change- no evolving STEMI. Denies CP. - awaiting CT head- CT tech called LORA Albert- Spoke with neuro surg regarding CT scan results- acute hydrocephalus- Neurosurgery called and made aware. Will see pt shortly. Call put in to Heme/Onc [facility]. LORA Albert- call back from [facility] to inform him of results- elaborated on hx- under went cyberknife radiation to brain, was on high dose decadron that was abruptly stopped when he went to the nursing home. LORA Albert- call back from [facility] to inform him of results- elaborated on hx- under went cyberknife radiation to brain, was on high dose decadron that was abruptly stopped when he went to the nursing home. Decadron 10mg IV ordered here. LORA Chicas Neurosjuan consulted- admitted to SICU, requesting 1 unit platelets LORA Albert- EKG's repeated twice s/p initial EKG with no acute change- no evolving STEMI. Denies CP. - awaiting CT head- CT tech called. K+ 4.9
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
## 2017-06-07 NOTE — BRIEF OPERATIVE NOTE - PRE-OP DX
Hydrocephalus  06/07/2017    Active  Sebastián Sutton
Hydrocephalus  06/07/2017    Active  Sebastián Sutton

## 2017-06-08 ENCOUNTER — RESULT REVIEW (OUTPATIENT)
Age: 62
End: 2017-06-08

## 2017-06-08 DIAGNOSIS — C34.90 MALIGNANT NEOPLASM OF UNSPECIFIED PART OF UNSPECIFIED BRONCHUS OR LUNG: ICD-10-CM

## 2017-06-08 DIAGNOSIS — Z98.2 PRESENCE OF CEREBROSPINAL FLUID DRAINAGE DEVICE: ICD-10-CM

## 2017-06-08 DIAGNOSIS — J96.00 ACUTE RESPIRATORY FAILURE, UNSPECIFIED WHETHER WITH HYPOXIA OR HYPERCAPNIA: ICD-10-CM

## 2017-06-08 DIAGNOSIS — G93.40 ENCEPHALOPATHY, UNSPECIFIED: ICD-10-CM

## 2017-06-08 DIAGNOSIS — R53.2 FUNCTIONAL QUADRIPLEGIA: ICD-10-CM

## 2017-06-08 DIAGNOSIS — Z51.5 ENCOUNTER FOR PALLIATIVE CARE: ICD-10-CM

## 2017-06-08 LAB
APTT BLD: 25 SEC — LOW (ref 27.5–37.4)
BACTERIA UR CULT: SIGNIFICANT CHANGE UP
BASE EXCESS BLDA CALC-SCNC: 0.1 MMOL/L — SIGNIFICANT CHANGE UP
BUN SERPL-MCNC: 40 MG/DL — HIGH (ref 7–23)
CALCIUM SERPL-MCNC: 9.4 MG/DL — SIGNIFICANT CHANGE UP (ref 8.4–10.5)
CHLORIDE SERPL-SCNC: 98 MMOL/L — SIGNIFICANT CHANGE UP (ref 98–107)
CO2 SERPL-SCNC: 22 MMOL/L — SIGNIFICANT CHANGE UP (ref 22–31)
CREAT SERPL-MCNC: 1.9 MG/DL — HIGH (ref 0.5–1.3)
GLUCOSE SERPL-MCNC: 149 MG/DL — HIGH (ref 70–99)
HBA1C BLD-MCNC: 4.5 % — SIGNIFICANT CHANGE UP (ref 4–5.6)
HCO3 BLDA-SCNC: 25 MMOL/L — SIGNIFICANT CHANGE UP (ref 22–26)
HCT VFR BLD CALC: 33.1 % — LOW (ref 39–50)
HGB BLD-MCNC: 10.3 G/DL — LOW (ref 13–17)
INR BLD: 0.94 — SIGNIFICANT CHANGE UP (ref 0.88–1.17)
MCHC RBC-ENTMCNC: 31.1 % — LOW (ref 32–36)
MCHC RBC-ENTMCNC: 32.4 PG — SIGNIFICANT CHANGE UP (ref 27–34)
MCV RBC AUTO: 104.1 FL — HIGH (ref 80–100)
PCO2 BLDA: 36 MMHG — SIGNIFICANT CHANGE UP (ref 35–48)
PH BLDA: 7.44 PH — SIGNIFICANT CHANGE UP (ref 7.35–7.45)
PLATELET # BLD AUTO: 173 K/UL — SIGNIFICANT CHANGE UP (ref 150–400)
PMV BLD: 9.8 FL — SIGNIFICANT CHANGE UP (ref 7–13)
PO2 BLDA: 193 MMHG — HIGH (ref 83–108)
POTASSIUM SERPL-MCNC: 4.6 MMOL/L — SIGNIFICANT CHANGE UP (ref 3.5–5.3)
POTASSIUM SERPL-SCNC: 4.6 MMOL/L — SIGNIFICANT CHANGE UP (ref 3.5–5.3)
PROTHROM AB SERPL-ACNC: 10.5 SEC — SIGNIFICANT CHANGE UP (ref 9.8–13.1)
RBC # BLD: 3.18 M/UL — LOW (ref 4.2–5.8)
RBC # FLD: 19.1 % — HIGH (ref 10.3–14.5)
SAO2 % BLDA: 100 % — HIGH (ref 95–99)
SODIUM SERPL-SCNC: 139 MMOL/L — SIGNIFICANT CHANGE UP (ref 135–145)
SPECIMEN SOURCE: SIGNIFICANT CHANGE UP
WBC # BLD: 8.53 K/UL — SIGNIFICANT CHANGE UP (ref 3.8–10.5)
WBC # FLD AUTO: 8.53 K/UL — SIGNIFICANT CHANGE UP (ref 3.8–10.5)

## 2017-06-08 PROCEDURE — 88108 CYTOPATH CONCENTRATE TECH: CPT | Mod: 26

## 2017-06-08 PROCEDURE — 71010: CPT | Mod: 26

## 2017-06-08 PROCEDURE — 99291 CRITICAL CARE FIRST HOUR: CPT

## 2017-06-08 PROCEDURE — 99292 CRITICAL CARE ADDL 30 MIN: CPT

## 2017-06-08 PROCEDURE — 99223 1ST HOSP IP/OBS HIGH 75: CPT

## 2017-06-08 PROCEDURE — 70450 CT HEAD/BRAIN W/O DYE: CPT | Mod: 26,77,GC

## 2017-06-08 PROCEDURE — 70450 CT HEAD/BRAIN W/O DYE: CPT | Mod: 26

## 2017-06-08 RX ORDER — LABETALOL HCL 100 MG
10 TABLET ORAL ONCE
Qty: 0 | Refills: 0 | Status: COMPLETED | OUTPATIENT
Start: 2017-06-08 | End: 2017-06-08

## 2017-06-08 RX ORDER — PANTOPRAZOLE SODIUM 20 MG/1
40 TABLET, DELAYED RELEASE ORAL DAILY
Qty: 0 | Refills: 0 | Status: DISCONTINUED | OUTPATIENT
Start: 2017-06-08 | End: 2017-06-12

## 2017-06-08 RX ORDER — CEFAZOLIN SODIUM 1 G
1000 VIAL (EA) INJECTION EVERY 12 HOURS
Qty: 0 | Refills: 0 | Status: COMPLETED | OUTPATIENT
Start: 2017-06-08 | End: 2017-06-09

## 2017-06-08 RX ORDER — MANNITOL
50 POWDER (GRAM) MISCELLANEOUS ONCE
Qty: 0 | Refills: 0 | Status: COMPLETED | OUTPATIENT
Start: 2017-06-08 | End: 2017-06-08

## 2017-06-08 RX ORDER — HYDROMORPHONE HYDROCHLORIDE 2 MG/ML
0.5 INJECTION INTRAMUSCULAR; INTRAVENOUS; SUBCUTANEOUS
Qty: 0 | Refills: 0 | Status: DISCONTINUED | OUTPATIENT
Start: 2017-06-08 | End: 2017-06-08

## 2017-06-08 RX ORDER — SODIUM CHLORIDE 5 G/100ML
500 INJECTION, SOLUTION INTRAVENOUS
Qty: 0 | Refills: 0 | Status: DISCONTINUED | OUTPATIENT
Start: 2017-06-08 | End: 2017-06-10

## 2017-06-08 RX ORDER — HYDRALAZINE HCL 50 MG
10 TABLET ORAL ONCE
Qty: 0 | Refills: 0 | Status: DISCONTINUED | OUTPATIENT
Start: 2017-06-08 | End: 2017-06-08

## 2017-06-08 RX ORDER — DEXAMETHASONE 0.5 MG/5ML
10 ELIXIR ORAL EVERY 4 HOURS
Qty: 0 | Refills: 0 | Status: DISCONTINUED | OUTPATIENT
Start: 2017-06-08 | End: 2017-06-13

## 2017-06-08 RX ORDER — INSULIN LISPRO 100/ML
VIAL (ML) SUBCUTANEOUS EVERY 6 HOURS
Qty: 0 | Refills: 0 | Status: DISCONTINUED | OUTPATIENT
Start: 2017-06-08 | End: 2017-06-13

## 2017-06-08 RX ORDER — LABETALOL HCL 100 MG
10 TABLET ORAL ONCE
Qty: 0 | Refills: 0 | Status: DISCONTINUED | OUTPATIENT
Start: 2017-06-08 | End: 2017-06-08

## 2017-06-08 RX ADMIN — Medication 101.6 MILLIGRAM(S): at 01:35

## 2017-06-08 RX ADMIN — Medication 1000 GRAM(S): at 05:58

## 2017-06-08 RX ADMIN — Medication 10 MILLIGRAM(S): at 01:43

## 2017-06-08 RX ADMIN — Medication 102 MILLIGRAM(S): at 22:14

## 2017-06-08 RX ADMIN — Medication 100 MILLIGRAM(S): at 02:30

## 2017-06-08 RX ADMIN — Medication 10 MILLIGRAM(S): at 05:57

## 2017-06-08 RX ADMIN — SODIUM CHLORIDE 75 MILLILITER(S): 5 INJECTION, SOLUTION INTRAVENOUS at 19:31

## 2017-06-08 RX ADMIN — Medication 102 MILLIGRAM(S): at 13:07

## 2017-06-08 RX ADMIN — DEXTROSE MONOHYDRATE, SODIUM CHLORIDE, AND POTASSIUM CHLORIDE 125 MILLILITER(S): 50; .745; 4.5 INJECTION, SOLUTION INTRAVENOUS at 05:58

## 2017-06-08 RX ADMIN — Medication 101.6 MILLIGRAM(S): at 06:53

## 2017-06-08 RX ADMIN — PANTOPRAZOLE SODIUM 40 MILLIGRAM(S): 20 TABLET, DELAYED RELEASE ORAL at 11:23

## 2017-06-08 RX ADMIN — Medication 102 MILLIGRAM(S): at 19:09

## 2017-06-08 RX ADMIN — SODIUM CHLORIDE 75 MILLILITER(S): 5 INJECTION, SOLUTION INTRAVENOUS at 09:44

## 2017-06-08 RX ADMIN — Medication 100 MILLIGRAM(S): at 18:12

## 2017-06-08 RX ADMIN — SODIUM CHLORIDE 75 MILLILITER(S): 5 INJECTION, SOLUTION INTRAVENOUS at 16:45

## 2017-06-08 NOTE — PROGRESS NOTE ADULT - SUBJECTIVE AND OBJECTIVE BOX
INTERVAL HPI/OVERNIGHT EVENTS:  s/p placement  shunt, became obtunded overnight and required intubation   VPS dialed from 4 down to 2     CTH overnight shows edema w/ mass effect on brainstem and smaller ventricular size. Official report is pending.    MEDICATIONS  (STANDING):  dexamethasone  IVPB 8milliGRAM(s) IV Intermittent every 8 hours  docusate sodium 100milliGRAM(s) Oral three times a day  pantoprazole  Injectable 40milliGRAM(s) IV Push daily  insulin lispro (HumaLOG) corrective regimen sliding scale  SubCutaneous every 6 hours  sodium chloride 1.5%. 500milliLiter(s) IV Continuous <Continuous>  ceFAZolin   IVPB 1000milliGRAM(s) IV Intermittent every 12 hours    MEDICATIONS  (PRN):      Allergies    No Known Allergies    Intolerances        REVIEW OF SYSTEMS  unable to obtain (intubated)    General:	    Skin/Breast:  	  Ophthalmologic:  	  ENMT:	    Respiratory and Thorax:  	  Cardiovascular:	    Gastrointestinal:	    Genitourinary:	    Musculoskeletal:	    Neurological:	    Psychiatric:	    Hematology/Lymphatics:	    Endocrine:	    Allergic/Immunologic:	    Vital Signs Last 24 Hrs  T(C): 35.8, Max: 36.6 ( @ 17:10)  T(F): 96.5, Max: 97.9 ( @ 17:10)  HR: 75 (61 - 98)  BP: 135/95 (111/74 - 188/111)  BP(mean): 106 (102 - 117)  RR: 10 (7 - 19)  SpO2: 100% (96% - 100%)    PHYSICAL EXAM:    Constitutional: intubated    Eyes: left pupil scarred (baseline) right pupil 4 mm and sluggishly reactive     ENMT:    Neck:    Breasts:    Back:    Respiratory:    Cardiovascular:    Gastrointestinal: abdomen is soft and nontender    Genitourinary:    Rectal:    Extremities:    Vascular:    Neurological:  not opening eyes  not following commands  withdrawing to pain x 4    Skin:    Lymph Nodes:    Musculoskeletal:    Psychiatric:        LABS:                        10.3   8.53  )-----------( 173      ( 2017 05:30 )             33.1         139  |  98  |  40<H>  ----------------------------<  149<H>  4.6   |  22  |  1.90<H>    Ca    9.4      2017 05:30    TPro  8.4<H>  /  Alb  4.0  /  TBili  0.5  /  DBili  x   /  AST  21  /  ALT  21  /  AlkPhos  68  06-07    PT/INR - ( 2017 13:41 )   PT: 10.6 SEC;   INR: 0.95          PTT - ( 2017 13:41 )  PTT:22.7 SEC  Urinalysis Basic - ( 2017 13:20 )    Color: PLYEL / Appearance: CLEAR / S.015 / pH: 7.0  Gluc: NEGATIVE / Ketone: NEGATIVE  / Bili: NEGATIVE / Urobili: NORMAL E.U.   Blood: NEGATIVE / Protein: 30 / Nitrite: NEGATIVE   Leuk Esterase: NEGATIVE / RBC: 0-2 / WBC x   Sq Epi: x / Non Sq Epi: x / Bacteria: x        RADIOLOGY & ADDITIONAL TESTS:

## 2017-06-08 NOTE — PROGRESS NOTE ADULT - SUBJECTIVE AND OBJECTIVE BOX
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 Continuous <Continuous>  sodium chloride 0.9% with potassium chloride 20 mEq/L 1000milliLiter(s) IV Continuous <Continuous>    HEMATOLOGIC  [x] VTE Prophylaxis:   Transfusions:	[] PRBC	[] Platelets		[] FFP	[] Cryoprecipitate    INFECTIOUS DISEASE  Antimicrobials/Immunologic Medications:  ceFAZolin   IVPB 2000milliGRAM(s) IV Intermittent every 8 hours      Tubes/Lines/Drains   [x] Peripheral IV  [] Central Venous Line     	[] R	[] L	[] IJ	[] Fem	[] SC	Date Placed:   [] Arterial Line		[] R	[] L	[] Fem	[] Rad	[] Ax	Date Placed:   [] PICC		[] Midline		[] Mediport  [] Urinary Catheter		Date Placed:   [x] Necessity of urinary, arterial, and venous catheters discussed    LABS  --------------------------------------------------------------------------------------  CBC ( @ 05:30)                          10.3<L>                   8.53    )--------------(  173        --    % Neuts, --    % Lymphs, ANC: --                              33.1<L>  CBC ( @ 19:05)                          9.4<L>                   7.58    )--------------(  194        92.2<H>% Neuts, 4.2<L>% Lymphs, ANC: 6.99                            29.8<L>    BMP ( @ 05:30)       139     |  98      |  40<H> 			Ca++ --      Ca 9.4          ---------------------------------( 149<H>		Mg --           4.6     |  22      |  1.90<H>			Ph --      BMP ( 13:58)       140     |  99      |  37<H> 			Ca++ --      Ca 9.6          ---------------------------------( 101<H>		Mg --           4.9     |  25      |  1.88<H>			Ph --        LFTs ( 13:58)      TPro 8.4<H> / Alb 4.0 / TBili 0.5 / DBili -- / AST 21 / ALT 21 / AlkPhos 68    Coags ( @ 13:41)  aPTT 22.7<L> / INR 0.95 / PT 10.6      ABG ( @ 05:30)     7.44 / 36 / 193<H> / 25 / 0.1 / 100<H>%     Lactate:      VBG ( 13:41)     7.37 / 52<H> / 29<L> / 27 / 4.6 / 40.2<L>%      Lactate: 1.8    Urinalysis ( @ 13:20):     Color: PLYEL / Appearance: CLEAR / S.015 / pH: 7.0 / Gluc: NEGATIVE / Ketones: NEGATIVE / Bili: NEGATIVE / Urobili: NORMAL / Protein :30 / Nitrites: NEGATIVE / Leuk.Est: NEGATIVE / RBC: 0-2 / WBC:  / Sq Epi:  / Non Sq Epi:  / Bacteria        -> CEREBRAL SPINAL FLUID Culture ( @ 22:30)       NOS^No Organisms Seen    NG  NG      --------------------------------------------------------------------------------------    CT: Status post placement of right ventricular drainage catheter with interval resolution of hydrocephalus. New acute subarachnoid hemorrhage in the left superior cerebellum.  Worsening mass effect in the posterior fossa with near complete effacement of the fourth ventricle and ascending transtentorial herniation.     ASSESSMENT:  61y male h/o lung ca with mets to brain p/w lethargy and confusion. Found to have increased intracranial tumor burden and acute moderate hydrocephalus on CT head.  shunt to be placed today by neurosurgery. Admitted to SICU for q1 hr neuro checks.    PLAN:   Neurologic: Pt with upgoing plantar reflexes and decerebrate posture. Pt taking slow spontaneous breaths when ventilator paused. Concern for transtentorial herniation. Mannitol given. Start saline 1.5% as per neuro. Neuro planning MRI this morning.  Respiratory: Cont on vent.  Cardiovascular: Monitor HR and BP.  Gastrointestinal/Nutrition: NPO.  Renal/Genitourinary: IV fluids, richardson, monitor UOP.  Hematologic: Trend CBC.  Infectious Disease: Cont Ancef.  Tubes/Lines/Drains: Peripheral IVs. Plan for possible central line.  Endocrine: No acute issue.  Disposition: Continue SICU care.    --------------------------------------------------------------------------------------    Critical Care Diagnoses:  Lung cancer with intracranial metastasis  Acute hydrocephalus  Obtundation  ATN LISA  Decerebration  Thrombocytopenia  Respiratory failure

## 2017-06-08 NOTE — PROGRESS NOTE ADULT - SUBJECTIVE AND OBJECTIVE BOX
A Team surgery    STATUS POST:  Laparoscopic placement of  shunt    SUBJECTIVE: The Pt tolerated the procedure well. Since leaving the OR he has been resting comfortably. His pain is well controlled. He denies any CP, SOB, N/V.    OBJECTIVE:  Vital Signs Last 24 Hrs  T(C): 36.3, Max: 36.6 (06-07 @ 17:10)  T(F): 97.3, Max: 97.9 (06-07 @ 17:10)  HR: 67 (61 - 98)  BP: 188/111 (138/107 - 188/111)  BP(mean): --  RR: 13 (11 - 19)  SpO2: 100% (97% - 100%)  I&O's Summary    I & Os for current day (as of 08 Jun 2017 02:21)  =============================================  IN: 480 ml / OUT: 775 ml / NET: -295 ml    I&O's Detail    I & Os for current day (as of 08 Jun 2017 02:21)  =============================================  IN:    sodium chloride 0.9% with potassium chloride 20 mEq/L: 480 ml    Total IN: 480 ml  ---------------------------------------------  OUT:    Indwelling Catheter - Urethral: 775 ml    Total OUT: 775 ml  ---------------------------------------------  Total NET: -295 ml      MEDICATIONS  (STANDING):  dexamethasone  IVPB 8milliGRAM(s) IV Intermittent every 8 hours  amLODIPine   Tablet 5milliGRAM(s) Oral daily  pantoprazole    Tablet 40milliGRAM(s) Oral before breakfast  ceFAZolin   IVPB 2000milliGRAM(s) IV Intermittent every 8 hours  insulin lispro (HumaLOG) corrective regimen sliding scale  SubCutaneous three times a day before meals  insulin lispro (HumaLOG) corrective regimen sliding scale  SubCutaneous at bedtime  dextrose 5%. 1000milliLiter(s) IV Continuous <Continuous>  dextrose 50% Injectable 12.5Gram(s) IV Push once  dextrose 50% Injectable 25Gram(s) IV Push once  dextrose 50% Injectable 25Gram(s) IV Push once  sodium chloride 0.9% with potassium chloride 20 mEq/L 1000milliLiter(s) IV Continuous <Continuous>  docusate sodium 100milliGRAM(s) Oral three times a day  hydrALAZINE Injectable 10milliGRAM(s) IV Push once    MEDICATIONS  (PRN):  dextrose Gel 1Dose(s) Oral once PRN Blood Glucose LESS THAN 70 milliGRAM(s)/deciliter  glucagon  Injectable 1milliGRAM(s) IntraMuscular once PRN Glucose LESS THAN 70 milligrams/deciliter  acetaminophen   Tablet 650milliGRAM(s) Oral every 6 hours PRN For Temp greater than 38.5 C (101.3 F)  acetaminophen   Tablet. 650milliGRAM(s) Oral every 6 hours PRN Mild Pain (1 - 3)  labetalol Injectable 10milliGRAM(s) IV Push every 1 hour PRN Systolic blood pressure >200  HYDROmorphone  Injectable 0.5milliGRAM(s) IV Push every 3 hours PRN Moderate to severe pain      LABS:                        9.4    7.58  )-----------( 194      ( 07 Jun 2017 19:05 )             29.8     06-07    140  |  99  |  37<H>  ----------------------------<  101<H>  4.9   |  25  |  1.88<H>    Ca    9.6      07 Jun 2017 13:58    TPro  8.4<H>  /  Alb  4.0  /  TBili  0.5  /  DBili  x   /  AST  21  /  ALT  21  /  AlkPhos  68  06-07    PT/INR - ( 07 Jun 2017 13:41 )   PT: 10.6 SEC;   INR: 0.95          PTT - ( 07 Jun 2017 13:41 )  PTT:22.7 SEC    PHYSICAL EXAM:  GEN: NAD  HEENT: Mild swelling around R superior palpebra. Shunt is palpable on the R side of the neck. No obvious signs of a hematoma  ABD: Soft, ND, appropriately tender, surgical site dressings are C/D/I.  EXTREMITIES: neg edema

## 2017-06-08 NOTE — CONSULT NOTE ADULT - PROBLEM SELECTOR RECOMMENDATION 9
Patient of Dr. Archibald, s/p surgical resection and chemo.  No disease oriented treatment at this time.  Continue supportive care.

## 2017-06-08 NOTE — PROGRESS NOTE ADULT - SUBJECTIVE AND OBJECTIVE BOX
Neurosurgery postop note    Patient initially lethargic, opening eyes, minimally verbal, following simple commands, STOUT  Now obtunded with snoring respirations  Vital Signs Last 24 Hrs  T(C): 36.3, Max: 36.6 (06-07 @ 17:10)  T(F): 97.3, Max: 97.9 (06-07 @ 17:10)  HR: 78 (61 - 98)  BP: 147/108 (138/107 - 188/111)  BP(mean): --  RR: 8 (8 - 19)  SpO2: 100% (97% - 100%)    Obtunded  Not opening eyes, not following commands  Right pupil 4mm, sluggishly reactive  Moving Right LE spontaneously, withdraws bilateral LE  No motor response bilateral UE    Dressing with small amount of bloody drainage    Shunt at 4

## 2017-06-08 NOTE — PROGRESS NOTE ADULT - SUBJECTIVE AND OBJECTIVE BOX
INTERVAL HPI/OVERNIGHT EVENTS: pt seen and examined in the PACU. On vent. Stable    MEDICATIONS  (STANDING):  docusate sodium 100milliGRAM(s) Oral three times a day  pantoprazole  Injectable 40milliGRAM(s) IV Push daily  insulin lispro (HumaLOG) corrective regimen sliding scale  SubCutaneous every 6 hours  sodium chloride 1.5%. 500milliLiter(s) IV Continuous <Continuous>  ceFAZolin   IVPB 1000milliGRAM(s) IV Intermittent every 12 hours  dexamethasone  IVPB 10milliGRAM(s) IV Intermittent every 4 hours    MEDICATIONS  (PRN):      Vital Signs Last 24 Hrs  T(C): 35.8, Max: 36.6 ( @ 17:10)  T(F): 96.5, Max: 97.9 ( @ 17:10)  HR: 78 (61 - 98)  BP: 152/96 (111/74 - 188/111)  BP(mean): 109 (102 - 117)  RR: 10 (7 - 19)  SpO2: 100% (96% - 100%)    PHYSICAL EXAM:      Constitutional: On ventilator, resting comfortably    Respiratory:    Gastrointestinal: Abd soft, NT, ND. Wounds C/D/I              I&O's Detail  I & Os for 24h ending 2017 07:00  =============================================  IN:    sodium chloride 0.9% with potassium chloride 20 mEq/L: 1095 ml    IV PiggyBack: 300 ml    Total IN: 1395 ml  ---------------------------------------------  OUT:    Indwelling Catheter - Urethral: 1585 ml    Total OUT: 1585 ml  ---------------------------------------------  Total NET: -190 ml    I & Os for current day (as of 2017 11:42)  =============================================  IN:    sodium chloride 1.5%.: 150 ml    sodium chloride 0.9% with potassium chloride 20 mEq/L: 125 ml    Total IN: 275 ml  ---------------------------------------------  OUT:    Indwelling Catheter - Urethral: 285 ml    Total OUT: 285 ml  ---------------------------------------------  Total NET: -10 ml      LABS:                        10.3   8.53  )-----------( 173      ( 2017 05:30 )             33.1     06-08    139  |  98  |  40<H>  ----------------------------<  149<H>  4.6   |  22  |  1.90<H>    Ca    9.4      2017 05:30    TPro  8.4<H>  /  Alb  4.0  /  TBili  0.5  /  DBili  x   /  AST  21  /  ALT  21  /  AlkPhos  68  06-07    PT/INR - ( 2017 09:43 )   PT: 10.5 SEC;   INR: 0.94          PTT - ( 2017 09:43 )  PTT:25.0 SEC  Urinalysis Basic - ( 2017 13:20 )    Color: PLYEL / Appearance: CLEAR / S.015 / pH: 7.0  Gluc: NEGATIVE / Ketone: NEGATIVE  / Bili: NEGATIVE / Urobili: NORMAL E.U.   Blood: NEGATIVE / Protein: 30 / Nitrite: NEGATIVE   Leuk Esterase: NEGATIVE / RBC: 0-2 / WBC x   Sq Epi: x / Non Sq Epi: x / Bacteria: x        RADIOLOGY & ADDITIONAL STUDIES:

## 2017-06-08 NOTE — PROGRESS NOTE ADULT - PROBLEM SELECTOR PLAN 1
- Wean vent as tolerated  - Monitor urine output  - No acute surgical issues. We will sign off. Please reconsult as needed.

## 2017-06-08 NOTE — CONSULT NOTE ADULT - SUBJECTIVE AND OBJECTIVE BOX
HPI:  60 y/o m h/o HTN, BPH, left eye blindness, left ear deafness, lung CA with metastasis to brain, recent gamma knife to brain,  follows Hem/Onc Paul Lee brought in via EMS after routine follow up appt with Dr. Lee [phone] . As per aide from [facility] in [place], Dr. Lee felt he made a drastic decline from 2 weeks ago noting change in ambulation and slurred speech.  The aid cannot tell me onset of symptoms. As per pt who is answering questions and responding to commands, states he "feels the need to pee but cannot go". Answers to questions delayed, mental cognition slowed. Does not appear in distress.  upon arrival to ER CT head shows multifocal intracranial metastatic disease is again noted. Some of the lesions have increased in size, and new moderate acute hydrocephalus involves the lateral and third ventricles, secondary to partial fourth ventricular effacement from the posterior fossa metastatic disease.     Currently patient in SICU s/p  shunt now with ct findings of New acute subarachnoid hemorrhage in the left superior cerebellum. Worsening mass effect in the posterior fossa with near complete effacement of the fourth ventricle and ascending transtentorial herniation. Status post acute respiratory failure and intubation, currently unresponsive.         PERTINENT PMH REVIEWED:  [x ] YES [ ] NO           SOCIAL HISTORY:  Significant other/partner:  [x ] YES  [ ] NO            Children:  [x ] YES  [ ] NO                   Amish/Spirituality:  Substance hx/Tobacco hx/Alcohol hx: unable to assess      Home Opioid hx:  [ ] YES  [x ] NO   Living Situation: [ ] Home  [ ] Long term care  [x ] Rehab    REFERRALS:   [ ] Chaplaincy  [ ] Hospice  [ ] Child Life  [ ] Social Work  [ ] Case management [ ] Holistic Therapy     FAMILY HISTORY:  Family history of heart disease: MI  Family history of heart disease: PPM, CHF    BASELINE ADLs (prior to admission):  Independent [ ] moderately [ ] fully   Dependent   [x ] moderately [ ] fully    ADVANCE DIRECTIVES:  [ ] YES [x ] NO         [x ] Surrogate  [ ] HCP  [ ] Guardian:       Hattie Reynolds                     Phone#: [phone]    Allergies    No Known Allergies    Intolerances        MEDICATIONS  (STANDING):  docusate sodium 100milliGRAM(s) Oral three times a day  pantoprazole  Injectable 40milliGRAM(s) IV Push daily  insulin lispro (HumaLOG) corrective regimen sliding scale  SubCutaneous every 6 hours  sodium chloride 1.5%. 500milliLiter(s) IV Continuous <Continuous>  ceFAZolin   IVPB 1000milliGRAM(s) IV Intermittent every 12 hours  dexamethasone  IVPB 10milliGRAM(s) IV Intermittent every 4 hours    MEDICATIONS  (PRN):      PRESENT SYMPTOMS:  Source: [ ] Patient   [ ] Family   [ ] Team     Pain: [ ] YES [ ] NO  Dyspnea: [ ] YES [ ] NO   Anxiety: [ ] YES [ ] NO  Fatigue: [ ] YES [ ] NO   Nausea: [ ] YES [ ] NO  Loss of appetite: [ ] YES [ ] NO   Constipation: [ ] YES [ ] NO     Other Symptoms:  [ ] All other review of systems negative   [x ] Unable to obtain due to poor mentation     Karnofsky Performance Score/Palliative Performance Status Version 2:   10%  Protein Calorie Malutrition:  [ ] Mild   [ ] Moderate   [ ] Severe     Vital Signs Last 24 Hrs  T(C): 35.9, Max: 36.6 ( @ 17:10)  T(F): 96.7, Max: 97.9 ( @ 17:10)  HR: 101 (61 - 101)  BP: 172/105 (111/74 - 188/111)  BP(mean): 120 (102 - 120)  RR: 11 (7 - 19)  SpO2: 100% (96% - 100%)    Physical Exam:    General: Unresponsive, intubated in the SICU s/p  shunt   HEENT: [ ] Normal   [ ] Dry mouth   [x ] ET Tube    [ ] Trach   Lungs: [x ] Clear [ ] Rhonchi  [ ] Crackles [ ] Wheezing [ ] Tachypnea  [ ] Audible excessive secretions   Cardiovascular:  [x ] Regular rate and rhythm  [ ] Irregular [ ] Tachycardia   [ ] Bradycardia   Abdomen: [x ] Soft  [ ] Distended  [ ]  [x ] +BS  [x ] Non tender [ ] Tender  [ ]PEG   [ ] NGT   Last BM:     Genitourinary: [ ] Normal [ ] Incontinent   [ ] Oliguria/Anuria   [x ] Mitchell  Musculoskeletal:  [ ] Normal   [ ] Generalized weakness  [x ] Bedbound   Neurological: unresponsive, s/p  shunt    Skin: [x ] Normal   [ ] Pressure ulcers     LABS:                        10.3   8.53  )-----------( 173      ( 2017 05:30 )             33.1     -    139  |  98  |  40<H>  ----------------------------<  149<H>  4.6   |  22  |  1.90<H>    Ca    9.4      2017 05:30    TPro  8.4<H>  /  Alb  4.0  /  TBili  0.5  /  DBili  x   /  AST  21  /  ALT  21  /  AlkPhos  68  06-07    PT/INR - ( 2017 09:43 )   PT: 10.5 SEC;   INR: 0.94          PTT - ( 2017 09:43 )  PTT:25.0 SEC  Urinalysis Basic - ( 2017 13:20 )    Color: PLYEL / Appearance: CLEAR / S.015 / pH: 7.0  Gluc: NEGATIVE / Ketone: NEGATIVE  / Bili: NEGATIVE / Urobili: NORMAL E.U.   Blood: NEGATIVE / Protein: 30 / Nitrite: NEGATIVE   Leuk Esterase: NEGATIVE / RBC: 0-2 / WBC x   Sq Epi: x / Non Sq Epi: x / Bacteria: x      I&O's Summary  I & Os for 24h ending 2017 07:00  =============================================  IN: 1395 ml / OUT: 1585 ml / NET: -190 ml    I & Os for current day (as of 2017 16:34)  =============================================  IN: 725 ml / OUT: 460 ml / NET: 265 ml      RADIOLOGY & ADDITIONAL STUDIES:  CT head 17  IMPRESSION:    Multifocal intracranial metastatic disease is again noted. Some of the   lesions have increased in size, and new moderate acute hydrocephalus   involves the lateral and third ventricles, secondary to partial fourth   ventricular effacement from the posterior fossa metastatic disease.

## 2017-06-08 NOTE — PROVIDER CONTACT NOTE (CHANGE IN STATUS NOTIFICATION) - ASSESSMENT
Pt unresponsive to verbal and tactile stimulus. upper extermities flaccid. Periods of apnes and chenye-stoke resp noted. noted to move right leg slightly nonvoluntarily. Bp remains elevated

## 2017-06-08 NOTE — CONSULT NOTE ADULT - PROBLEM SELECTOR RECOMMENDATION 5
Multiple family members at bedside (wife, sons, daughters, daughter in law).  Awaiting discussion with neurosurgery regarding treatment recommendations going further and prognosis.  Emotional support provided.

## 2017-06-08 NOTE — PATIENT PROFILE ADULT. - FUNCTIONAL SCREEN CURRENT LEVEL: COMMUNICATION, MLM
(0) understands/communicates without difficulty (3) unable to understand or speak (not related to language barrier)

## 2017-06-08 NOTE — PATIENT PROFILE ADULT. - NS TRANSFER PATIENT BELONGINGS
Wrist Watch/Other belongings/[employer] S5; house keys, $120/Jewelry/Money (specify)/Clothing/Cell Phone/PDA (specify) Other belongings/Clothing

## 2017-06-08 NOTE — PATIENT PROFILE ADULT. - VISION (WITH CORRECTIVE LENSES IF THE PATIENT USUALLY WEARS THEM):
Partially impaired: cannot see medication labels or newsprint, but can see obstacles in path, and the surrounding layout; can count fingers at arm's length/Blind in left eye

## 2017-06-08 NOTE — PROGRESS NOTE ADULT - ASSESSMENT
61-year old man w/ stage IV lung ca s/p emergency placement of  shunt, lap assisted, with poor exam   -Continue high dose steroids   -Continue hypertonics  -Needs MRI   -discussed with SICU  -will also discuss w/ family

## 2017-06-08 NOTE — PROGRESS NOTE ADULT - PROBLEM SELECTOR PLAN 1
Intubated for airway protection  Shunt reprogrammed to 2  Will obtain noncontrast brain CT to evaluate for hemorrhage or worsening hydrocephalus

## 2017-06-08 NOTE — DIETITIAN INITIAL EVALUATION ADULT. - OTHER INFO
Pt seen for critical care LOS.  Pt s/p intubation post surgery for  shunt.  As per RN, pt for Palliative consult.  Pt transferred from NH - unable to obtain nutrition hx at this time.  Pt w/prior hospitalizations - wt status compared  -with loss noted.

## 2017-06-08 NOTE — CONSULT NOTE ADULT - PROBLEM SELECTOR RECOMMENDATION 3
Patient with metastatic disease to brain with hydrocephalus s/p  shunt with subsequent subarachnoid hemorrhage, now unresponsive.  Continue management as per SICU/neurosurgery.  Prognosis poor.

## 2017-06-08 NOTE — PROGRESS NOTE ADULT - ASSESSMENT
A/P: 61y Male w/ Hydrocephalus s/p Laparoscopic placement of  shunt  - Pain control  - DVT ppx  - f/u a.m. labs  - OOB  - Abx x 24hrs  - Primary management per NSx  - Advance diet as tolerated  - Monitor u/o  - Postop Head CT & MRI

## 2017-06-09 ENCOUNTER — TRANSCRIPTION ENCOUNTER (OUTPATIENT)
Age: 62
End: 2017-06-09

## 2017-06-09 PROCEDURE — 99291 CRITICAL CARE FIRST HOUR: CPT

## 2017-06-09 RX ORDER — ACETAMINOPHEN 500 MG
1000 TABLET ORAL ONCE
Qty: 0 | Refills: 0 | Status: COMPLETED | OUTPATIENT
Start: 2017-06-09 | End: 2017-06-09

## 2017-06-09 RX ADMIN — Medication 102 MILLIGRAM(S): at 06:24

## 2017-06-09 RX ADMIN — Medication 102 MILLIGRAM(S): at 10:45

## 2017-06-09 RX ADMIN — Medication 400 MILLIGRAM(S): at 06:24

## 2017-06-09 RX ADMIN — SODIUM CHLORIDE 75 MILLILITER(S): 5 INJECTION, SOLUTION INTRAVENOUS at 22:27

## 2017-06-09 RX ADMIN — Medication 102 MILLIGRAM(S): at 18:08

## 2017-06-09 RX ADMIN — Medication 100 MILLIGRAM(S): at 06:24

## 2017-06-09 RX ADMIN — SODIUM CHLORIDE 75 MILLILITER(S): 5 INJECTION, SOLUTION INTRAVENOUS at 08:00

## 2017-06-09 RX ADMIN — Medication 102 MILLIGRAM(S): at 14:46

## 2017-06-09 RX ADMIN — Medication 102 MILLIGRAM(S): at 02:08

## 2017-06-09 RX ADMIN — Medication 102 MILLIGRAM(S): at 22:27

## 2017-06-09 RX ADMIN — PANTOPRAZOLE SODIUM 40 MILLIGRAM(S): 20 TABLET, DELAYED RELEASE ORAL at 11:57

## 2017-06-09 NOTE — PROGRESS NOTE ADULT - SUBJECTIVE AND OBJECTIVE BOX
Patient made DNR   Family discussion held yesterday with myself, the patient's wife, and Dr. Lind    Patient remained intubated   L eye blind  R pupil 4 mm and fixed   Occasionally overbreathes the vent  Flaccid x4
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
## 2017-06-10 LAB
ANISOCYTOSIS BLD QL: SIGNIFICANT CHANGE UP
BASOPHILS NFR SPEC: 0 % — SIGNIFICANT CHANGE UP (ref 0–2)
BUN SERPL-MCNC: 60 MG/DL — HIGH (ref 7–23)
CALCIUM SERPL-MCNC: 9.3 MG/DL — SIGNIFICANT CHANGE UP (ref 8.4–10.5)
CHLORIDE SERPL-SCNC: 117 MMOL/L — HIGH (ref 98–107)
CO2 SERPL-SCNC: 22 MMOL/L — SIGNIFICANT CHANGE UP (ref 22–31)
CREAT SERPL-MCNC: 2.18 MG/DL — HIGH (ref 0.5–1.3)
EOSINOPHIL NFR FLD: 0 % — SIGNIFICANT CHANGE UP (ref 0–6)
GLUCOSE SERPL-MCNC: 119 MG/DL — HIGH (ref 70–99)
HCT VFR BLD CALC: 34.8 % — LOW (ref 39–50)
HGB BLD-MCNC: 10.3 G/DL — LOW (ref 13–17)
LYMPHOCYTES NFR SPEC AUTO: 6.1 % — LOW (ref 13–44)
MACROCYTES BLD QL: SIGNIFICANT CHANGE UP
MAGNESIUM SERPL-MCNC: 2.4 MG/DL — SIGNIFICANT CHANGE UP (ref 1.6–2.6)
MCHC RBC-ENTMCNC: 29.6 % — LOW (ref 32–36)
MCHC RBC-ENTMCNC: 32.2 PG — SIGNIFICANT CHANGE UP (ref 27–34)
MCV RBC AUTO: 108.8 FL — HIGH (ref 80–100)
MONOCYTES NFR BLD: 1.7 % — LOW (ref 2–9)
NEUTROPHIL AB SER-ACNC: 92.2 % — HIGH (ref 43–77)
OVALOCYTES BLD QL SMEAR: SLIGHT — SIGNIFICANT CHANGE UP
PHOSPHATE SERPL-MCNC: 5 MG/DL — HIGH (ref 2.5–4.5)
PLATELET # BLD AUTO: 95 K/UL — LOW (ref 150–400)
PLATELET COUNT - ESTIMATE: SIGNIFICANT CHANGE UP
PMV BLD: 9.6 FL — SIGNIFICANT CHANGE UP (ref 7–13)
POIKILOCYTOSIS BLD QL AUTO: SLIGHT — SIGNIFICANT CHANGE UP
POTASSIUM SERPL-MCNC: 5.2 MMOL/L — SIGNIFICANT CHANGE UP (ref 3.5–5.3)
POTASSIUM SERPL-SCNC: 5.2 MMOL/L — SIGNIFICANT CHANGE UP (ref 3.5–5.3)
RBC # BLD: 3.2 M/UL — LOW (ref 4.2–5.8)
RBC # FLD: 19.5 % — HIGH (ref 10.3–14.5)
SCHISTOCYTES BLD QL AUTO: SLIGHT — SIGNIFICANT CHANGE UP
SODIUM SERPL-SCNC: 153 MMOL/L — HIGH (ref 135–145)
WBC # BLD: 8 K/UL — SIGNIFICANT CHANGE UP (ref 3.8–10.5)
WBC # FLD AUTO: 8 K/UL — SIGNIFICANT CHANGE UP (ref 3.8–10.5)

## 2017-06-10 PROCEDURE — 99291 CRITICAL CARE FIRST HOUR: CPT

## 2017-06-10 RX ORDER — VANCOMYCIN HCL 1 G
1000 VIAL (EA) INTRAVENOUS EVERY 24 HOURS
Qty: 0 | Refills: 0 | Status: DISCONTINUED | OUTPATIENT
Start: 2017-06-11 | End: 2017-06-13

## 2017-06-10 RX ORDER — SODIUM CHLORIDE 9 MG/ML
1000 INJECTION INTRAMUSCULAR; INTRAVENOUS; SUBCUTANEOUS
Qty: 0 | Refills: 0 | Status: DISCONTINUED | OUTPATIENT
Start: 2017-06-10 | End: 2017-06-11

## 2017-06-10 RX ORDER — VANCOMYCIN HCL 1 G
1000 VIAL (EA) INTRAVENOUS ONCE
Qty: 0 | Refills: 0 | Status: COMPLETED | OUTPATIENT
Start: 2017-06-10 | End: 2017-06-10

## 2017-06-10 RX ORDER — VANCOMYCIN HCL 1 G
VIAL (EA) INTRAVENOUS
Qty: 0 | Refills: 0 | Status: DISCONTINUED | OUTPATIENT
Start: 2017-06-10 | End: 2017-06-13

## 2017-06-10 RX ADMIN — Medication 102 MILLIGRAM(S): at 09:17

## 2017-06-10 RX ADMIN — SODIUM CHLORIDE 75 MILLILITER(S): 9 INJECTION INTRAMUSCULAR; INTRAVENOUS; SUBCUTANEOUS at 05:36

## 2017-06-10 RX ADMIN — SODIUM CHLORIDE 75 MILLILITER(S): 9 INJECTION INTRAMUSCULAR; INTRAVENOUS; SUBCUTANEOUS at 20:00

## 2017-06-10 RX ADMIN — Medication 102 MILLIGRAM(S): at 17:44

## 2017-06-10 RX ADMIN — Medication 102 MILLIGRAM(S): at 14:30

## 2017-06-10 RX ADMIN — Medication 102 MILLIGRAM(S): at 05:36

## 2017-06-10 RX ADMIN — Medication 102 MILLIGRAM(S): at 21:12

## 2017-06-10 RX ADMIN — Medication 102 MILLIGRAM(S): at 03:00

## 2017-06-10 RX ADMIN — SODIUM CHLORIDE 75 MILLILITER(S): 9 INJECTION INTRAMUSCULAR; INTRAVENOUS; SUBCUTANEOUS at 09:17

## 2017-06-10 RX ADMIN — PANTOPRAZOLE SODIUM 40 MILLIGRAM(S): 20 TABLET, DELAYED RELEASE ORAL at 12:02
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
## 2017-06-11 LAB
BUN SERPL-MCNC: 69 MG/DL — HIGH (ref 7–23)
BUN SERPL-MCNC: 70 MG/DL — HIGH (ref 7–23)
CALCIUM SERPL-MCNC: 9.2 MG/DL — SIGNIFICANT CHANGE UP (ref 8.4–10.5)
CALCIUM SERPL-MCNC: 9.5 MG/DL — SIGNIFICANT CHANGE UP (ref 8.4–10.5)
CHLORIDE SERPL-SCNC: 118 MMOL/L — HIGH (ref 98–107)
CHLORIDE SERPL-SCNC: 118 MMOL/L — HIGH (ref 98–107)
CO2 SERPL-SCNC: 20 MMOL/L — LOW (ref 22–31)
CO2 SERPL-SCNC: 22 MMOL/L — SIGNIFICANT CHANGE UP (ref 22–31)
CREAT SERPL-MCNC: 2.22 MG/DL — HIGH (ref 0.5–1.3)
CREAT SERPL-MCNC: 2.22 MG/DL — HIGH (ref 0.5–1.3)
GLUCOSE SERPL-MCNC: 123 MG/DL — HIGH (ref 70–99)
GLUCOSE SERPL-MCNC: 127 MG/DL — HIGH (ref 70–99)
HCT VFR BLD CALC: 35 % — LOW (ref 39–50)
HGB BLD-MCNC: 10.3 G/DL — LOW (ref 13–17)
MAGNESIUM SERPL-MCNC: 2.6 MG/DL — SIGNIFICANT CHANGE UP (ref 1.6–2.6)
MAGNESIUM SERPL-MCNC: 2.6 MG/DL — SIGNIFICANT CHANGE UP (ref 1.6–2.6)
MCHC RBC-ENTMCNC: 29.4 % — LOW (ref 32–36)
MCHC RBC-ENTMCNC: 32.7 PG — SIGNIFICANT CHANGE UP (ref 27–34)
MCV RBC AUTO: 111.1 FL — HIGH (ref 80–100)
PHOSPHATE SERPL-MCNC: 4.4 MG/DL — SIGNIFICANT CHANGE UP (ref 2.5–4.5)
PHOSPHATE SERPL-MCNC: 4.7 MG/DL — HIGH (ref 2.5–4.5)
PLATELET # BLD AUTO: 77 K/UL — LOW (ref 150–400)
PMV BLD: 10.1 FL — SIGNIFICANT CHANGE UP (ref 7–13)
POTASSIUM SERPL-MCNC: 5.6 MMOL/L — HIGH (ref 3.5–5.3)
POTASSIUM SERPL-MCNC: 5.8 MMOL/L — HIGH (ref 3.5–5.3)
POTASSIUM SERPL-SCNC: 5.6 MMOL/L — HIGH (ref 3.5–5.3)
POTASSIUM SERPL-SCNC: 5.8 MMOL/L — HIGH (ref 3.5–5.3)
RBC # BLD: 3.15 M/UL — LOW (ref 4.2–5.8)
RBC # FLD: 19.5 % — HIGH (ref 10.3–14.5)
SODIUM SERPL-SCNC: 153 MMOL/L — HIGH (ref 135–145)
SODIUM SERPL-SCNC: 155 MMOL/L — HIGH (ref 135–145)
WBC # BLD: 7.41 K/UL — SIGNIFICANT CHANGE UP (ref 3.8–10.5)
WBC # FLD AUTO: 7.41 K/UL — SIGNIFICANT CHANGE UP (ref 3.8–10.5)

## 2017-06-11 PROCEDURE — 99291 CRITICAL CARE FIRST HOUR: CPT

## 2017-06-11 RX ORDER — ALBUTEROL 90 UG/1
10 AEROSOL, METERED ORAL ONCE
Qty: 0 | Refills: 0 | Status: COMPLETED | OUTPATIENT
Start: 2017-06-11 | End: 2017-06-11

## 2017-06-11 RX ORDER — DEXTROSE 50 % IN WATER 50 %
25 SYRINGE (ML) INTRAVENOUS ONCE
Qty: 0 | Refills: 0 | Status: COMPLETED | OUTPATIENT
Start: 2017-06-11 | End: 2017-06-11

## 2017-06-11 RX ORDER — SODIUM CHLORIDE 9 MG/ML
1000 INJECTION, SOLUTION INTRAVENOUS
Qty: 0 | Refills: 0 | Status: DISCONTINUED | OUTPATIENT
Start: 2017-06-11 | End: 2017-06-12

## 2017-06-11 RX ORDER — ALBUTEROL 90 UG/1
2.5 AEROSOL, METERED ORAL ONCE
Qty: 0 | Refills: 0 | Status: DISCONTINUED | OUTPATIENT
Start: 2017-06-11 | End: 2017-06-11

## 2017-06-11 RX ORDER — INSULIN HUMAN 100 [IU]/ML
10 INJECTION, SOLUTION SUBCUTANEOUS ONCE
Qty: 0 | Refills: 0 | Status: COMPLETED | OUTPATIENT
Start: 2017-06-11 | End: 2017-06-11

## 2017-06-11 RX ADMIN — Medication 102 MILLIGRAM(S): at 10:22

## 2017-06-11 RX ADMIN — Medication 25 MILLILITER(S): at 14:49

## 2017-06-11 RX ADMIN — Medication 102 MILLIGRAM(S): at 19:39

## 2017-06-11 RX ADMIN — Medication 250 MILLIGRAM(S): at 00:31

## 2017-06-11 RX ADMIN — INSULIN HUMAN 10 UNIT(S): 100 INJECTION, SOLUTION SUBCUTANEOUS at 14:49

## 2017-06-11 RX ADMIN — PANTOPRAZOLE SODIUM 40 MILLIGRAM(S): 20 TABLET, DELAYED RELEASE ORAL at 11:44

## 2017-06-11 RX ADMIN — Medication 102 MILLIGRAM(S): at 22:40

## 2017-06-11 RX ADMIN — Medication 102 MILLIGRAM(S): at 14:12

## 2017-06-11 RX ADMIN — Medication 102 MILLIGRAM(S): at 06:16

## 2017-06-11 RX ADMIN — ALBUTEROL 10 MILLIGRAM(S): 90 AEROSOL, METERED ORAL at 15:18

## 2017-06-11 RX ADMIN — SODIUM CHLORIDE 75 MILLILITER(S): 9 INJECTION, SOLUTION INTRAVENOUS at 22:40

## 2017-06-11 RX ADMIN — Medication 102 MILLIGRAM(S): at 01:57

## 2017-06-11 RX ADMIN — Medication 250 MILLIGRAM(S): at 22:40

## 2017-06-11 NOTE — PROGRESS NOTE ADULT - SUBJECTIVE AND OBJECTIVE BOX
Subjective:   61 M with terminal metastatic lung ca  No overnight issues    T(C): 37.8, Max: 37.8 (06-11 @ 00:00)  HR: 123 (118 - 127)  BP: 165/116 (149/111 - 174/130)  RR: 10 (10 - 11)  SpO2: 100% (100% - 100%)  Wt(kg): --    Exam:  Intubated, no FC, pupil nonreactive, No movement to stimulation    CBC Full  -  ( 11 Jun 2017 03:40 )  WBC Count : 7.41 K/uL  Hemoglobin : 10.3 g/dL  Hematocrit : 35.0 %  Platelet Count - Automated : 77 K/uL  Mean Cell Volume : 111.1 fL  Mean Cell Hemoglobin : 32.7 pg  Mean Cell Hemoglobin Concentration : 29.4 %  Auto Neutrophil # : x  Auto Lymphocyte # : x  Auto Monocyte # : x  Auto Eosinophil # : x  Auto Basophil # : x  Auto Neutrophil % : x  Auto Lymphocyte % : x  Auto Monocyte % : x  Auto Eosinophil % : x  Auto Basophil % : x    06-11    155<H>  |  118<H>  |  70<H>  ----------------------------<  123<H>  5.6<H>   |  22  |  2.22<H>    Ca    9.5      11 Jun 2017 03:40  Phos  4.4     06-11  Mg     2.6     06-11    Assessment/Plan:  - Cont SICU care  - Pt DNR, DNI  - Likely terminal extubation on Mon, will discuss with family.
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
## 2017-06-12 LAB
BACTERIA BLD CULT: SIGNIFICANT CHANGE UP
BACTERIA BLD CULT: SIGNIFICANT CHANGE UP
BASE EXCESS BLDA CALC-SCNC: 2.7 MMOL/L — SIGNIFICANT CHANGE UP
BUN SERPL-MCNC: 78 MG/DL — HIGH (ref 7–23)
BUN SERPL-MCNC: 78 MG/DL — HIGH (ref 7–23)
CA-I BLDA-SCNC: 1.36 MMOL/L — HIGH (ref 1.15–1.29)
CALCIUM SERPL-MCNC: 9.7 MG/DL — SIGNIFICANT CHANGE UP (ref 8.4–10.5)
CALCIUM SERPL-MCNC: 9.8 MG/DL — SIGNIFICANT CHANGE UP (ref 8.4–10.5)
CHLORIDE SERPL-SCNC: 117 MMOL/L — HIGH (ref 98–107)
CHLORIDE SERPL-SCNC: 119 MMOL/L — HIGH (ref 98–107)
CO2 SERPL-SCNC: 27 MMOL/L — SIGNIFICANT CHANGE UP (ref 22–31)
CO2 SERPL-SCNC: 28 MMOL/L — SIGNIFICANT CHANGE UP (ref 22–31)
CREAT SERPL-MCNC: 2.36 MG/DL — HIGH (ref 0.5–1.3)
CREAT SERPL-MCNC: 2.37 MG/DL — HIGH (ref 0.5–1.3)
GLUCOSE BLDA-MCNC: 143 MG/DL — HIGH (ref 70–99)
GLUCOSE SERPL-MCNC: 123 MG/DL — HIGH (ref 70–99)
GLUCOSE SERPL-MCNC: 143 MG/DL — HIGH (ref 70–99)
HCO3 BLDA-SCNC: 26 MMOL/L — SIGNIFICANT CHANGE UP (ref 22–26)
HCT VFR BLD CALC: 41.2 % — SIGNIFICANT CHANGE UP (ref 39–50)
HCT VFR BLDA CALC: 32.1 % — LOW (ref 39–51)
HGB BLD-MCNC: 12.1 G/DL — LOW (ref 13–17)
HGB BLDA-MCNC: 10.4 G/DL — LOW (ref 13–17)
LACTATE BLDA-SCNC: 1.1 MMOL/L — SIGNIFICANT CHANGE UP (ref 0.5–2)
MAGNESIUM SERPL-MCNC: 2.6 MG/DL — SIGNIFICANT CHANGE UP (ref 1.6–2.6)
MCHC RBC-ENTMCNC: 29.4 % — LOW (ref 32–36)
MCHC RBC-ENTMCNC: 33.1 PG — SIGNIFICANT CHANGE UP (ref 27–34)
MCV RBC AUTO: 112.6 FL — HIGH (ref 80–100)
PCO2 BLDA: 49 MMHG — HIGH (ref 35–48)
PH BLDA: 7.37 PH — SIGNIFICANT CHANGE UP (ref 7.35–7.45)
PHOSPHATE SERPL-MCNC: 4.2 MG/DL — SIGNIFICANT CHANGE UP (ref 2.5–4.5)
PLATELET # BLD AUTO: 36 K/UL — LOW (ref 150–400)
PMV BLD: 10.1 FL — SIGNIFICANT CHANGE UP (ref 7–13)
PO2 BLDA: 170 MMHG — HIGH (ref 83–108)
POTASSIUM BLDA-SCNC: 5.8 MMOL/L — HIGH (ref 3.4–4.5)
POTASSIUM SERPL-MCNC: 6 MMOL/L — HIGH (ref 3.5–5.3)
POTASSIUM SERPL-MCNC: 6.2 MMOL/L — CRITICAL HIGH (ref 3.5–5.3)
POTASSIUM SERPL-SCNC: 6 MMOL/L — HIGH (ref 3.5–5.3)
POTASSIUM SERPL-SCNC: 6.2 MMOL/L — CRITICAL HIGH (ref 3.5–5.3)
RBC # BLD: 3.66 M/UL — LOW (ref 4.2–5.8)
RBC # FLD: 18.8 % — HIGH (ref 10.3–14.5)
SAO2 % BLDA: 99.8 % — HIGH (ref 95–99)
SODIUM BLDA-SCNC: 155 MMOL/L — HIGH (ref 136–146)
SODIUM SERPL-SCNC: 156 MMOL/L — HIGH (ref 135–145)
SODIUM SERPL-SCNC: 158 MMOL/L — HIGH (ref 135–145)
WBC # BLD: 7.47 K/UL — SIGNIFICANT CHANGE UP (ref 3.8–10.5)
WBC # FLD AUTO: 7.47 K/UL — SIGNIFICANT CHANGE UP (ref 3.8–10.5)

## 2017-06-12 PROCEDURE — 99291 CRITICAL CARE FIRST HOUR: CPT

## 2017-06-12 PROCEDURE — 71010: CPT | Mod: 26,76

## 2017-06-12 RX ORDER — SODIUM POLYSTYRENE SULFONATE 4.1 MEQ/G
15 POWDER, FOR SUSPENSION ORAL ONCE
Qty: 0 | Refills: 0 | Status: DISCONTINUED | OUTPATIENT
Start: 2017-06-12 | End: 2017-06-12

## 2017-06-12 RX ORDER — ALBUTEROL 90 UG/1
2.5 AEROSOL, METERED ORAL ONCE
Qty: 0 | Refills: 0 | Status: DISCONTINUED | OUTPATIENT
Start: 2017-06-12 | End: 2017-06-12

## 2017-06-12 RX ORDER — INSULIN HUMAN 100 [IU]/ML
10 INJECTION, SOLUTION SUBCUTANEOUS ONCE
Qty: 0 | Refills: 0 | Status: COMPLETED | OUTPATIENT
Start: 2017-06-12 | End: 2017-06-12

## 2017-06-12 RX ORDER — ALBUTEROL 90 UG/1
10 AEROSOL, METERED ORAL ONCE
Qty: 0 | Refills: 0 | Status: DISCONTINUED | OUTPATIENT
Start: 2017-06-12 | End: 2017-06-13

## 2017-06-12 RX ORDER — DEXTROSE 50 % IN WATER 50 %
50 SYRINGE (ML) INTRAVENOUS ONCE
Qty: 0 | Refills: 0 | Status: COMPLETED | OUTPATIENT
Start: 2017-06-12 | End: 2017-06-13

## 2017-06-12 RX ORDER — INSULIN HUMAN 100 [IU]/ML
10 INJECTION, SOLUTION SUBCUTANEOUS ONCE
Qty: 0 | Refills: 0 | Status: COMPLETED | OUTPATIENT
Start: 2017-06-12 | End: 2017-06-13

## 2017-06-12 RX ORDER — SODIUM CHLORIDE 9 MG/ML
1000 INJECTION INTRAMUSCULAR; INTRAVENOUS; SUBCUTANEOUS
Qty: 0 | Refills: 0 | Status: DISCONTINUED | OUTPATIENT
Start: 2017-06-12 | End: 2017-06-12

## 2017-06-12 RX ORDER — DEXTROSE 50 % IN WATER 50 %
50 SYRINGE (ML) INTRAVENOUS ONCE
Qty: 0 | Refills: 0 | Status: DISCONTINUED | OUTPATIENT
Start: 2017-06-12 | End: 2017-06-12

## 2017-06-12 RX ORDER — SODIUM CHLORIDE 9 MG/ML
1000 INJECTION INTRAMUSCULAR; INTRAVENOUS; SUBCUTANEOUS
Qty: 0 | Refills: 0 | Status: DISCONTINUED | OUTPATIENT
Start: 2017-06-12 | End: 2017-06-13

## 2017-06-12 RX ORDER — ALBUTEROL 90 UG/1
4 AEROSOL, METERED ORAL EVERY 4 HOURS
Qty: 0 | Refills: 0 | Status: DISCONTINUED | OUTPATIENT
Start: 2017-06-12 | End: 2017-06-12

## 2017-06-12 RX ORDER — PANTOPRAZOLE SODIUM 20 MG/1
40 TABLET, DELAYED RELEASE ORAL DAILY
Qty: 0 | Refills: 0 | Status: DISCONTINUED | OUTPATIENT
Start: 2017-06-12 | End: 2017-06-13

## 2017-06-12 RX ORDER — SODIUM CHLORIDE 9 MG/ML
1000 INJECTION INTRAMUSCULAR; INTRAVENOUS; SUBCUTANEOUS ONCE
Qty: 0 | Refills: 0 | Status: COMPLETED | OUTPATIENT
Start: 2017-06-12 | End: 2017-06-12

## 2017-06-12 RX ORDER — INSULIN HUMAN 100 [IU]/ML
10 INJECTION, SOLUTION SUBCUTANEOUS ONCE
Qty: 0 | Refills: 0 | Status: DISCONTINUED | OUTPATIENT
Start: 2017-06-12 | End: 2017-06-12

## 2017-06-12 RX ORDER — SODIUM BICARBONATE 1 MEQ/ML
50 SYRINGE (ML) INTRAVENOUS ONCE
Qty: 0 | Refills: 0 | Status: COMPLETED | OUTPATIENT
Start: 2017-06-12 | End: 2017-06-12

## 2017-06-12 RX ORDER — SODIUM CHLORIDE 9 MG/ML
1000 INJECTION, SOLUTION INTRAVENOUS
Qty: 0 | Refills: 0 | Status: DISCONTINUED | OUTPATIENT
Start: 2017-06-12 | End: 2017-06-12

## 2017-06-12 RX ORDER — DEXTROSE 50 % IN WATER 50 %
50 SYRINGE (ML) INTRAVENOUS ONCE
Qty: 0 | Refills: 0 | Status: COMPLETED | OUTPATIENT
Start: 2017-06-12 | End: 2017-06-12

## 2017-06-12 RX ADMIN — SODIUM CHLORIDE 75 MILLILITER(S): 9 INJECTION INTRAMUSCULAR; INTRAVENOUS; SUBCUTANEOUS at 22:14

## 2017-06-12 RX ADMIN — Medication 102 MILLIGRAM(S): at 22:14

## 2017-06-12 RX ADMIN — Medication 50 MILLIEQUIVALENT(S): at 16:00

## 2017-06-12 RX ADMIN — INSULIN HUMAN 10 UNIT(S): 100 INJECTION, SOLUTION SUBCUTANEOUS at 16:00

## 2017-06-12 RX ADMIN — PANTOPRAZOLE SODIUM 40 MILLIGRAM(S): 20 TABLET, DELAYED RELEASE ORAL at 12:15

## 2017-06-12 RX ADMIN — Medication 102 MILLIGRAM(S): at 09:45

## 2017-06-12 RX ADMIN — Medication 102 MILLIGRAM(S): at 02:45

## 2017-06-12 RX ADMIN — SODIUM CHLORIDE 75 MILLILITER(S): 9 INJECTION INTRAMUSCULAR; INTRAVENOUS; SUBCUTANEOUS at 19:19

## 2017-06-12 RX ADMIN — Medication 102 MILLIGRAM(S): at 19:18

## 2017-06-12 RX ADMIN — Medication 102 MILLIGRAM(S): at 05:08

## 2017-06-12 RX ADMIN — Medication 102 MILLIGRAM(S): at 15:56

## 2017-06-12 RX ADMIN — SODIUM CHLORIDE 100 MILLILITER(S): 9 INJECTION, SOLUTION INTRAVENOUS at 21:49

## 2017-06-12 RX ADMIN — Medication 50 MILLILITER(S): at 16:00

## 2017-06-12 RX ADMIN — SODIUM CHLORIDE 2000 MILLILITER(S): 9 INJECTION INTRAMUSCULAR; INTRAVENOUS; SUBCUTANEOUS at 12:10

## 2017-06-12 RX ADMIN — Medication 250 MILLIGRAM(S): at 23:13
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
## 2017-06-12 NOTE — PROGRESS NOTE ADULT - ASSESSMENT
62 YO male with Lung cancer, multiple brain metastases, S/P ventriculoperitoneal shunt, now showing no signs of neurologic activity

## 2017-06-12 NOTE — PROGRESS NOTE ADULT - SUBJECTIVE AND OBJECTIVE BOX
Patient remains unresponsive, on ventilator  ICU Vital Signs Last 24 Hrs  T(C): 38.2, Max: 38.3 (06-11 @ 20:00)  T(F): 100.8, Max: 100.9 (06-11 @ 20:00)  HR: 120 (120 - 131)  BP: 156/106 (149/95 - 165/116)  BP(mean): 117 (109 - 127)  ABP: --  ABP(mean): --  RR: 10 (9 - 11)  SpO2: 100% (100% - 100%)     Unresponsive  Not opening eyes, not following commands  Right pupil 5mm, fixed  Upper extremities flaccid  Lower extremities weak triple flexion  No corneals, no Doll's eyes, no gag, not overbreathing ventilator

## 2017-06-13 VITALS — RESPIRATION RATE: 14 BRPM | OXYGEN SATURATION: 100 % | HEART RATE: 110 BPM

## 2017-06-13 DIAGNOSIS — N17.9 ACUTE KIDNEY FAILURE, UNSPECIFIED: ICD-10-CM

## 2017-06-13 DIAGNOSIS — E87.0 HYPEROSMOLALITY AND HYPERNATREMIA: ICD-10-CM

## 2017-06-13 DIAGNOSIS — E87.5 HYPERKALEMIA: ICD-10-CM

## 2017-06-13 LAB
APPEARANCE UR: SIGNIFICANT CHANGE UP
BACTERIA CSF CULT: SIGNIFICANT CHANGE UP
BASE EXCESS BLDA CALC-SCNC: 1.1 MMOL/L — SIGNIFICANT CHANGE UP
BILIRUB UR-MCNC: NEGATIVE — SIGNIFICANT CHANGE UP
BLOOD UR QL VISUAL: HIGH
BUN SERPL-MCNC: 75 MG/DL — HIGH (ref 7–23)
BUN SERPL-MCNC: 76 MG/DL — HIGH (ref 7–23)
CA-I BLD-SCNC: 1.25 MMOL/L — HIGH (ref 1.03–1.23)
CA-I BLDA-SCNC: 1.4 MMOL/L — HIGH (ref 1.15–1.29)
CALCIUM SERPL-MCNC: 9.4 MG/DL — SIGNIFICANT CHANGE UP (ref 8.4–10.5)
CALCIUM SERPL-MCNC: 9.4 MG/DL — SIGNIFICANT CHANGE UP (ref 8.4–10.5)
CHLORIDE SERPL-SCNC: 120 MMOL/L — HIGH (ref 98–107)
CHLORIDE SERPL-SCNC: 122 MMOL/L — HIGH (ref 98–107)
CHLORIDE UR-SCNC: 73 MMOL/L — SIGNIFICANT CHANGE UP
CK SERPL-CCNC: 359 U/L — HIGH (ref 30–200)
CO2 SERPL-SCNC: 25 MMOL/L — SIGNIFICANT CHANGE UP (ref 22–31)
CO2 SERPL-SCNC: 26 MMOL/L — SIGNIFICANT CHANGE UP (ref 22–31)
COLOR SPEC: SIGNIFICANT CHANGE UP
CREAT SERPL-MCNC: 2.08 MG/DL — HIGH (ref 0.5–1.3)
CREAT SERPL-MCNC: 2.16 MG/DL — HIGH (ref 0.5–1.3)
GLUCOSE BLDA-MCNC: 176 MG/DL — HIGH (ref 70–99)
GLUCOSE SERPL-MCNC: 177 MG/DL — HIGH (ref 70–99)
GLUCOSE SERPL-MCNC: 181 MG/DL — HIGH (ref 70–99)
GLUCOSE UR-MCNC: 50 — SIGNIFICANT CHANGE UP
HCO3 BLDA-SCNC: 25 MMOL/L — SIGNIFICANT CHANGE UP (ref 22–26)
HCT VFR BLD CALC: 34.3 % — LOW (ref 39–50)
HCT VFR BLDA CALC: 41.7 % — SIGNIFICANT CHANGE UP (ref 39–51)
HGB BLD-MCNC: 10.2 G/DL — LOW (ref 13–17)
HGB BLDA-MCNC: 13.6 G/DL — SIGNIFICANT CHANGE UP (ref 13–17)
KETONES UR-MCNC: NEGATIVE — SIGNIFICANT CHANGE UP
LACTATE BLDA-SCNC: 1.4 MMOL/L — SIGNIFICANT CHANGE UP (ref 0.5–2)
LEUKOCYTE ESTERASE UR-ACNC: NEGATIVE — SIGNIFICANT CHANGE UP
MAGNESIUM SERPL-MCNC: 2.4 MG/DL — SIGNIFICANT CHANGE UP (ref 1.6–2.6)
MAGNESIUM SERPL-MCNC: 2.4 MG/DL — SIGNIFICANT CHANGE UP (ref 1.6–2.6)
MCHC RBC-ENTMCNC: 29.7 % — LOW (ref 32–36)
MCHC RBC-ENTMCNC: 33.1 PG — SIGNIFICANT CHANGE UP (ref 27–34)
MCV RBC AUTO: 111.4 FL — HIGH (ref 80–100)
MUCOUS THREADS # UR AUTO: SIGNIFICANT CHANGE UP
NITRITE UR-MCNC: NEGATIVE — SIGNIFICANT CHANGE UP
OSMOLALITY UR: 488 MOSMO/KG — SIGNIFICANT CHANGE UP (ref 50–1200)
PCO2 BLDA: 48 MMHG — SIGNIFICANT CHANGE UP (ref 35–48)
PH BLDA: 7.35 PH — SIGNIFICANT CHANGE UP (ref 7.35–7.45)
PH UR: 5 — SIGNIFICANT CHANGE UP (ref 4.6–8)
PHOSPHATE SERPL-MCNC: 4.1 MG/DL — SIGNIFICANT CHANGE UP (ref 2.5–4.5)
PHOSPHATE SERPL-MCNC: 4.1 MG/DL — SIGNIFICANT CHANGE UP (ref 2.5–4.5)
PLATELET # BLD AUTO: 33 K/UL — LOW (ref 150–400)
PMV BLD: SIGNIFICANT CHANGE UP FL (ref 7–13)
PO2 BLDA: 150 MMHG — HIGH (ref 83–108)
POTASSIUM BLDA-SCNC: 5.3 MMOL/L — HIGH (ref 3.4–4.5)
POTASSIUM SERPL-MCNC: 5.5 MMOL/L — HIGH (ref 3.5–5.3)
POTASSIUM SERPL-MCNC: 5.7 MMOL/L — HIGH (ref 3.5–5.3)
POTASSIUM SERPL-SCNC: 5.5 MMOL/L — HIGH (ref 3.5–5.3)
POTASSIUM SERPL-SCNC: 5.7 MMOL/L — HIGH (ref 3.5–5.3)
POTASSIUM UR-SCNC: 28 MEQ/L — SIGNIFICANT CHANGE UP
PROT UR-MCNC: 20 — SIGNIFICANT CHANGE UP
RBC # BLD: 3.08 M/UL — LOW (ref 4.2–5.8)
RBC # FLD: 18.9 % — HIGH (ref 10.3–14.5)
RBC CASTS # UR COMP ASSIST: HIGH (ref 0–?)
SAO2 % BLDA: 99.2 % — HIGH (ref 95–99)
SODIUM BLDA-SCNC: 154 MMOL/L — HIGH (ref 136–146)
SODIUM SERPL-SCNC: 158 MMOL/L — HIGH (ref 135–145)
SODIUM SERPL-SCNC: 159 MMOL/L — HIGH (ref 135–145)
SODIUM UR-SCNC: 83 MEQ/L — SIGNIFICANT CHANGE UP
SP GR SPEC: 1.01 — SIGNIFICANT CHANGE UP (ref 1–1.03)
URATE CRY FLD QL MICRO: SIGNIFICANT CHANGE UP (ref 0–0)
UROBILINOGEN FLD QL: NORMAL E.U. — SIGNIFICANT CHANGE UP (ref 0.1–0.2)
WBC # BLD: 7.23 K/UL — SIGNIFICANT CHANGE UP (ref 3.8–10.5)
WBC # FLD AUTO: 7.23 K/UL — SIGNIFICANT CHANGE UP (ref 3.8–10.5)
WBC UR QL: SIGNIFICANT CHANGE UP (ref 0–?)

## 2017-06-13 PROCEDURE — 93010 ELECTROCARDIOGRAM REPORT: CPT

## 2017-06-13 PROCEDURE — 71010: CPT | Mod: 26

## 2017-06-13 PROCEDURE — 99291 CRITICAL CARE FIRST HOUR: CPT

## 2017-06-13 PROCEDURE — 99223 1ST HOSP IP/OBS HIGH 75: CPT | Mod: GC

## 2017-06-13 RX ORDER — SODIUM CHLORIDE 9 MG/ML
1000 INJECTION, SOLUTION INTRAVENOUS
Qty: 0 | Refills: 0 | Status: DISCONTINUED | OUTPATIENT
Start: 2017-06-13 | End: 2017-06-13

## 2017-06-13 RX ORDER — DEXTROSE 50 % IN WATER 50 %
50 SYRINGE (ML) INTRAVENOUS ONCE
Qty: 0 | Refills: 0 | Status: COMPLETED | OUTPATIENT
Start: 2017-06-13 | End: 2017-06-13

## 2017-06-13 RX ORDER — SODIUM POLYSTYRENE SULFONATE 4.1 MEQ/G
15 POWDER, FOR SUSPENSION ORAL ONCE
Qty: 0 | Refills: 0 | Status: COMPLETED | OUTPATIENT
Start: 2017-06-13 | End: 2017-06-13

## 2017-06-13 RX ORDER — MORPHINE SULFATE 50 MG/1
1 CAPSULE, EXTENDED RELEASE ORAL
Qty: 100 | Refills: 0 | Status: DISCONTINUED | OUTPATIENT
Start: 2017-06-13 | End: 2017-06-13

## 2017-06-13 RX ORDER — ROBINUL 0.2 MG/ML
0.2 INJECTION INTRAMUSCULAR; INTRAVENOUS EVERY 6 HOURS
Qty: 0 | Refills: 0 | Status: DISCONTINUED | OUTPATIENT
Start: 2017-06-13 | End: 2017-06-13

## 2017-06-13 RX ORDER — CALCIUM GLUCONATE 100 MG/ML
2 VIAL (ML) INTRAVENOUS ONCE
Qty: 0 | Refills: 0 | Status: COMPLETED | OUTPATIENT
Start: 2017-06-13 | End: 2017-06-13

## 2017-06-13 RX ORDER — ALBUTEROL 90 UG/1
4 AEROSOL, METERED ORAL EVERY 4 HOURS
Qty: 0 | Refills: 0 | Status: DISCONTINUED | OUTPATIENT
Start: 2017-06-13 | End: 2017-06-13

## 2017-06-13 RX ORDER — INSULIN HUMAN 100 [IU]/ML
10 INJECTION, SOLUTION SUBCUTANEOUS ONCE
Qty: 0 | Refills: 0 | Status: COMPLETED | OUTPATIENT
Start: 2017-06-13 | End: 2017-06-13

## 2017-06-13 RX ORDER — FUROSEMIDE 40 MG
40 TABLET ORAL ONCE
Qty: 0 | Refills: 0 | Status: COMPLETED | OUTPATIENT
Start: 2017-06-13 | End: 2017-06-13

## 2017-06-13 RX ADMIN — Medication 50 MILLILITER(S): at 00:40

## 2017-06-13 RX ADMIN — ROBINUL 0.2 MILLIGRAM(S): 0.2 INJECTION INTRAMUSCULAR; INTRAVENOUS at 14:30

## 2017-06-13 RX ADMIN — MORPHINE SULFATE 1 MG/HR: 50 CAPSULE, EXTENDED RELEASE ORAL at 14:30

## 2017-06-13 RX ADMIN — Medication 102 MILLIGRAM(S): at 05:35

## 2017-06-13 RX ADMIN — INSULIN HUMAN 10 UNIT(S): 100 INJECTION, SOLUTION SUBCUTANEOUS at 05:34

## 2017-06-13 RX ADMIN — INSULIN HUMAN 10 UNIT(S): 100 INJECTION, SOLUTION SUBCUTANEOUS at 00:40

## 2017-06-13 RX ADMIN — Medication 40 MILLIGRAM(S): at 08:15

## 2017-06-13 RX ADMIN — Medication 102 MILLIGRAM(S): at 02:49

## 2017-06-13 RX ADMIN — Medication 50 MILLILITER(S): at 05:34

## 2017-06-13 RX ADMIN — SODIUM CHLORIDE 100 MILLILITER(S): 9 INJECTION, SOLUTION INTRAVENOUS at 05:33

## 2017-06-13 RX ADMIN — SODIUM POLYSTYRENE SULFONATE 15 GRAM(S): 4.1 POWDER, FOR SUSPENSION ORAL at 05:34

## 2017-06-13 RX ADMIN — Medication 200 GRAM(S): at 10:00

## 2017-06-13 RX ADMIN — MORPHINE SULFATE 1 MG/HR: 50 CAPSULE, EXTENDED RELEASE ORAL at 14:39

## 2017-06-13 RX ADMIN — Medication 1: at 00:38

## 2017-06-13 RX ADMIN — Medication 1: at 06:14

## 2017-06-13 RX ADMIN — Medication 102 MILLIGRAM(S): at 11:41

## 2017-06-13 RX ADMIN — SODIUM CHLORIDE 100 MILLILITER(S): 9 INJECTION, SOLUTION INTRAVENOUS at 08:16

## 2017-06-13 RX ADMIN — PANTOPRAZOLE SODIUM 40 MILLIGRAM(S): 20 TABLET, DELAYED RELEASE ORAL at 13:19

## 2017-06-13 NOTE — PROGRESS NOTE ADULT - SUBJECTIVE AND OBJECTIVE BOX
SICU Resident progress note    After discussion with surgical ICU team and in agreement with neurosurgical team, family decided to electively extubate the patient as he was determined to have no reasonable chance for meaningful neurologic recovery.  Patient extubated with family at bedside.  After patient noted to be in asystole and with no signs of life, patient evaluated and found to have no breath sounds bilaterally and no heart sounds with no pulse and no other signs of life.  He was pronounced dead at 2:55 pm.

## 2017-06-13 NOTE — CONSULT NOTE ADULT - ASSESSMENT
61M with metastatic lung cancer to brain, s/p  shunt placement on 6/8/17, with LISA on CKD and hyperkalemia
62 y/o male with metastatic lung CA with hydrocephalus s/p  shunt with subsequent subarachnoid hemorrhage currently intubated due to acute respiratory failure.
patient is a 61 year old male with acute hydrocephalus.  -NPO  -OR for  shunt.   -Consent in chart

## 2017-06-13 NOTE — CONSULT NOTE ADULT - PROBLEM SELECTOR PROBLEM 1
LISA (acute kidney injury)
Malignant neoplasm of lung, unspecified laterality, unspecified part of lung

## 2017-06-13 NOTE — CONSULT NOTE ADULT - ATTENDING COMMENTS
Hypernatremia(permissive?) hyperkalemia with LISA.  on  free water and IVF. Lasix IV as needed. K 5.5 improving.  goals of care discussion is pending by SICU  D/W Dr. Max. SICU attending at bedside

## 2017-06-13 NOTE — CONSULT NOTE ADULT - PROBLEM SELECTOR RECOMMENDATION 9
Pt. with LISA on CKD (baseline Scr appears to be ~1.8). Pt. non-oliguric, however with hyperkalemia with EKG changes.   Recommend to check renal ultrasound.  Continue to monitor BMP. Strict I/Os. Avoid nephrotoxins, NSAIDs, RCA. Pt. with LISA on CKD (baseline Scr appears to be ~1.8). Etiology of LISA unclear at present, likely hemodynamically mediated LISA in the setting of sepsis. Pt. non-oliguric, however with hyperkalemia with EKG changes.   Recommend to check CK level. Continue IVF.  Recommend to check renal ultrasound.  Continue to monitor BMP. Strict I/Os. Avoid nephrotoxins, NSAIDs, RCA. Pt. with LISA on CKD (baseline Scr appears to be ~1.8-1.9). Etiology of LISA unclear at present, likely hemodynamically mediated LISA in the setting of sepsis. Pt. non-oliguric, however with hyperkalemia with EKG changes.   Recommend to check CK level. Continue IVF.  Recommend to check renal ultrasound.  Continue to monitor BMP. Strict I/Os. Avoid nephrotoxins, NSAIDs, RCA.
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
## 2017-06-13 NOTE — CONSULT NOTE ADULT - PROBLEM SELECTOR RECOMMENDATION 2
in the setting of LISA, pt. however non oliguric with good urine output.  Pt. noted to have peaked T waves on EKG.  Pt. s/p insulin+D5W x 2 overnight, with slight decrease in serum potassium from 6.2 to 5.7.  Recommend to give kayexalate 30 grams stat and continue with IVF and medical management of hyperkalemia. in the setting of LISA, pt. however non oliguric with good urine output.  Pt. noted to have peaked T waves on EKG.  Pt. s/p insulin+D5W x 2 overnight, with slight decrease in serum potassium from 6.2 to 5.7.  Recommend to give calcium gluconate and kayexalate 30 grams stat and continue with IVF and medical management of hyperkalemia.  Overall prognosis poor, pt. is a poor candidate for RRT, pending goals of care discussion with family.  Monitor serum potassium. in the setting of LISA and hyperglycemia, pt. however non oliguric with good urine output.  Pt. noted to have peaked T waves on EKG.  Pt. s/p insulin+D5W x 2 overnight, with slight decrease in serum potassium from 6.2 to 5.7.  Recommend to give calcium gluconate and kayexalate 30 grams stat and continue with IVF and medical management of hyperkalemia. Consider giving lasix 40mg IVP.  Recommend better glycemic control. Monitor serum potassium.  Overall prognosis poor, pt. is a poor candidate for RRT, pending goals of care discussion with family.

## 2017-06-13 NOTE — DISCHARGE NOTE FOR THE EXPIRED PATIENT - HOSPITAL COURSE
60 y/o m h/o HTN, BPH, left eye blindness, left ear deafness, lung CA with metastasis to brain s/p recent gamma knife to brain found to have a change in ambulation and slurred speech.  He was brought to the ED, where CT head showed multifocal intracranial metastatic disease with increase in size of lesions have increased in size, and new moderate acute hydrocephalus involves the lateral and third ventricles, secondary to partial fourth ventricular effacement from the posterior fossa metastatic disease. 60 y/o m h/o HTN, BPH, left eye blindness, left ear deafness, lung CA with metastasis to brain s/p recent gamma knife to brain found to have a change in ambulation and slurred speech.  He was brought to the ED, where CT head showed multifocal intracranial metastatic disease with increase in size of some lesions and new moderate acute hydrocephalus.  He underwent a  shunt, after which there was resolution of the hydrocephalus.  Postoperatively, he developed altered mental status and had to be intubated for airway protection.  He was found to have a new subarachnoid hemorrhage on follow up CT postoperatively consistent with a hemorrhagic metastatic lesion.  He was brought to the surgical ICU for neurologic monitoring.  Over the course of his SICU stay, he deteriorated neurologically with no return of consciousness off sedation and loss of brain stem reflexes.  No further interventions were available, and he had no chance for meaningful recovery.  His family determined that an elective extubation would be consistent with his wishes, and he was electively extubated.  Time of death was 2:55 pm on 6/13/17.

## 2017-06-13 NOTE — PROGRESS NOTE ADULT - ATTENDING COMMENTS
61y Male male h/o lung ca with mets to brain p/w lethargy and confusion. Found to have increased intracranial tumor burden and acute moderate hydrocephalus on CT head s/p  shunt, patient became obtunded in PACU and was intubated for airway protection. Repeat CT head showing SAH.     PLAN  Neurologic: Patient remains off sedation, continue decadron 10mg q 4hrs  Respiratory: Continue with ventilator support, VAP prevention  Cardiovascular: DNR status, monitor vitals   Gastrointestinal/Nutrition: NPO  Renal/Genitourinary: 1.5% sodium chloride, monitor urine output, maintain richardson  Hematologic: No active issues  Infectious Disease: No active issues  Endocrine: Insulin sliding scale  Disposition: Continue with SICU care, continue family discussion regarding ongoing care.  Health care proxy and family agree with DNR status for patient.    --------------------------------------------------------------------------------------    Critical Care Diagnoses:  Lung cancer with intracranial metastasis  Acute hydrocephalus  Obtundation  ATN LISA  Decerebration  Thrombocytopenia  Respiratory failure    The patient is a critical care patient with hemodynamic and metabolic instability in SICU.  I have personally interviewed and examined this patient, reviewed labs and x-rays, discussed with other consultants, House staff and PA's.  I spent   45  minutes  in total providing critical care for the diagnoses, assessment and plan above.  These diagnoses are unrelated to the surgical procedure noted above.  I met with family 45    min to get further history and make care decisions for this patient who is unable to participate due to altered mental status.  Time involved in performance of separately billable procedures was not counted toward my critical care time.  There is no overlap.
I examined this patient on AM SICU rounds with the multidisciplinary team.  All pertinent laboratories and radiologic studies were reviewed.  The patient is critically ill and hemodynamically volatile.  I spent a total of 40 minutes with the patient.  Agree with above PA note except where detailed.   Issues:    Acute respiratory failure requiring ongoing ventilatory support.  Gas exchange is satisfactory but mental status does not allow weaning.  Altered mental status:  coma with GCS of 4(T) secondary to hydrocephalus, intracranial bleed, and brain metastases from metastatic lung cancer.  Do not expect improvement.  Acute on chronic kidney disease with resultant acidosis and hyperkalemia.  We are treating significant hyperkalemia but the patient may need renal replacement therapy to adequately keep up.  Hypernatremia:  need to volume resuscitate and replace free water.  Anemia: h/h stable for now.    I don't expect this patient to survive based on the data recorded.  We are continuing full supportive measures and awaiting a family meeting to discuss goals of care.
I examined this patient on multidisciplinary rounds with the SICU team.  All pertinent laboratory and radiologic studies reviewed.  Held a family conference regarding "goals of care" with the family (wife as HCP).  Issues:    Coma:  GCS 3(t) this morning.  Minimally reactive pupil on right (4.5 mm), absent oculocephalics.  No response to deep painful stimulus.  Respiratory failure (acute) :  gas exchange is satisfactory, however, there is no opportunity to wean the patient given his absent mental status.  Acute Kidney Injury - resulting in hyperkalemia (requiring numerous manipulations to keep below 6) and likely contributing to hypernatremia (159).   Likely Diabetes insipidus:  brisk diuresis with elevated sodium level  Thrombocytopenia: platelets 33K  would transfuse except family decision to withdraw life sustaining support.  Metastatic lung cancer with lesions in brain.  Intracranial hemmorrhage.  Neurosurgery agrees no reasonable expectation of recovery.    After family discussion - will proceed with removal of ventilator/ETT.  Comfort measures only.
61y male h/o lung ca with mets to brain p/w lethargy and confusion. Found to have increased intracranial tumor burden and acute moderate hydrocephalus on CT head.  shunt to be placed today by neurosurgery. Admitted to SICU for q1 hr neuro checks.    PLAN:   Neurologic: Pt given decadron 10mg in ED for increased ICP. Start decadron 8mg q 8 hrs. Neuro checks q 1 hr.  Respiratory: No acute issue.  Cardiovascular: No acute issue.  Gastrointestinal/Nutrition: NPO for OR.  Renal/Genitourinary: IV fluids, richardson, monitor UOP.  Hematologic: Platelet count 60, transfuse 1 unit platelets prior to OR.  Infectious Disease: Follow up blood cultures.  Tubes/Lines/Drains: Peripheral IVs.  Endocrine: No acute issue.  Disposition: Continue SICU care.    --------------------------------------------------------------------------------------    Critical Care Diagnoses:  Lung cancer with intracranial metastasis  Acute hydrocephalus  Lethargy  Thrombocytopenia  Respiratory failure  I have seen and examined the patient, reviewed the laboratory and radiologic data and agree with practitioner's history, physical assessment, plan and reviewed and edited where appropriate.    The patient is a critical care patient with hemodynamic and metabolic instability in SICU.  I have personally interviewed and examined this patient, reviewed labs and x-rays, discussed with other consultants, House staff and PA's.  I spent  60   min in total providing critical care for the diagnoses, assessment and plan above.  These diagnoses are unrelated to the surgical procedure noted above.    I met with family     min to get further history and make care decisions for this patient who is unable to participate due to altered mental status.    Time involved in performance of separately billable procedures was not counted toward my critical care time.  There is no overlap.
61y male h/o lung ca with mets to brain p/w lethargy and confusion. Found to have increased intracranial tumor burden and acute moderate hydrocephalus on CT head.  shunt to be placed today by neurosurgery. Admitted to SICU for q1 hr neuro checks.    PLAN:   Neurologic: Pt with upgoing plantar reflexes and decerebrate posture. Pt taking slow spontaneous breaths when ventilator paused. Concern for transtentorial herniation. Mannitol given. Start saline 1.5% as per neuro. Neuro planning MRI this morning.  Respiratory: Cont on vent.  Cardiovascular: Monitor HR and BP.  Gastrointestinal/Nutrition: NPO.  Renal/Genitourinary: IV fluids, richardson, monitor UOP.  Hematologic: Trend CBC.  Infectious Disease: Cont Ancef.  Tubes/Lines/Drains: Peripheral IVs. Plan for possible central line.  Endocrine: No acute issue.  Disposition: Continue SICU care.    --------------------------------------------------------------------------------------    Critical Care Diagnoses:  Lung cancer with intracranial metastasis  Acute hydrocephalus  Obtundation  ATN LISA  Decerebration  Thrombocytopenia  Respiratory failure    The patient is a critical care patient with hemodynamic and metabolic instability in SICU.  I have personally interviewed and examined this patient, reviewed labs and x-rays, discussed with other consultants, House staff and PA's.  I spent 60    minutes  in total providing critical care for the diagnoses, assessment and plan above.  These diagnoses are unrelated to the surgical procedure noted above.  I met with family     min to get further history and make care decisions for this patient who is unable to participate due to altered mental status.  Time involved in performance of separately billable procedures was not counted toward my critical care time.  There is no overlap.
The patient is a critical care patient with hemodynamic and metabolic instability in SICU.  I have personally interviewed and examined this patient, reviewed labs and x-rays, discussed with other consultants, House staff and PA's.  I spent   35  minutes  in total providing critical care for the diagnoses, assessment and plan above.  These diagnoses are unrelated to the surgical procedure noted above.  Time involved in performance of separately billable procedures was not counted toward my critical care time.  There is no overlap.    dx:  C79.31 Brain metastases   - dismal prognosis.  awaiting family decision regarding likely withdrawal of care.  Pt is DNR  I62.9 Intracranial bleed   - as above  J96.00 Acute respiratory failure, unspecified whether with hypoxia or hypercapnia   - stable on current settings
The patient is a critical care patient with hemodynamic and metabolic instability in SICU.  I have personally interviewed and examined this patient, reviewed labs and x-rays, discussed with other consultants, House staff and PA's.  I spent  35   minutes  in total providing critical care for the diagnoses, assessment and plan above.  These diagnoses are unrelated to the surgical procedure noted above.  Time involved in performance of separately billable procedures was not counted toward my critical care time.  There is no overlap.    dx:  C79.31 Brain metastases/I62.9 Intracranial bleed   - unchanged neuro exam.  Continuing to hold sedation.  grim prognosis  J96.00 Acute respiratory failure, unspecified whether with hypoxia or hypercapnia   - secondary to impaired ventilatory drive.  Otherwise maintaining good gas exchange
The patient is a critical care patient with hemodynamic and metabolic instability in SICU.  I have personally interviewed and examined this patient, reviewed labs and x-rays, discussed with other consultants, House staff and PA's.  I spent  40   minutes  in total providing critical care for the diagnoses, assessment and plan above.  These diagnoses are unrelated to the surgical procedure noted above.    Dx:   C79.31 Brain metastases/I62.9 Intracranial bleed   - no further operative plan as per neurosurg.  prognosis grim.  Per sign-out I received, family will likely withdraw care once they have all arrived to see patient  J96.00 Acute respiratory failure, unspecified whether with hypoxia or hypercapnia   - secondary to impaired ventilatory drive.  otherwise maintaining good gas exchange

## 2017-06-13 NOTE — CONSULT NOTE ADULT - PROBLEM SELECTOR RECOMMENDATION 3
in the setting of lack of free water access.  Recommend to change IVF from NS to 1/2NS.  Recommend to start free water supplementation. in the setting of lack of free water access.  Recommend to change IVF from NS to 1/2NS+D5W.  Recommend to start free water supplementation. Give free water 600 q 6 hourly.  Monitor serum sodium.

## 2017-07-10 ENCOUNTER — APPOINTMENT (OUTPATIENT)
Dept: RADIATION ONCOLOGY | Facility: CLINIC | Age: 62
End: 2017-07-10

## 2018-04-19 NOTE — ED PROVIDER NOTE - MEDICAL DECISION MAKING DETAILS
Clinic hours for Dr. Grier:  Monday    In Surgery  Tuesday 7:30am - 4:30pm  Wednesday 7:30am - 4:30pm  Thursday       7:30am - 4:30pm  Friday  7:30 - 11am    If you need a refill on your prescription, please call your pharmacy and let them know. Please be proactive and call before your medication runs out. The pharmacy will then contact us for the refill. Please allow 24-48 hours for the refill to be processed.     If your Specialty Provider has ordered additional laboratory or radiology testing the results will be discussed at your next visit. This will allow you the opportunity to go over the results in person with your provider. If your results require immediate intervention, you will be contacted sooner by phone call.    You may be receiving a patient satisfaction survey in the mail or in your email.  If you receive an email survey, please look for the subject line of:   \" Your provider name\" would like your feedback\".   Please take the time to complete your survey either via the mail or email, as your feedback is very important to us.  We strive to make your experience exceptional and your comments help us with that goal.  We look forward to hearing from you.            60 y/o m h/o HTN, BPH, left eye blindness, left ear deafness, lung CA with metastasis to brain follows Hem/Onc Paul Lee brought in via EMS after routine follow up appt with Dr. Lee noting slurred speech and ataxia unsown onset- CT head r/o CVA vs. worsening metastasis, septic work up, EKG, CXR

## 2019-02-19 NOTE — PROCEDURE NOTE - NSURICATHINSERTNUMXS_GU_A_CORE
Patient has not smoked for 10 days, was not Chantix, not taking that any more, encouraged her to continue to not smoke 1

## 2019-09-13 NOTE — ED ADULT TRIAGE NOTE - NS ED NURSE AMBULANCES
[de-identified] : Jj is a pleasant 45-year-old gentleman with history of thoracic aortic aneurysm, he came in for annual physical examination today. He offers no complaints. [facility]

## 2019-10-24 NOTE — H&P PST ADULT - NEGATIVE GASTROINTESTINAL SYMPTOMS
no hematochezia/no diarrhea/no melena/no abdominal pain/no vomiting/no constipation/no change in bowel habits/no nausea right

## 2019-12-31 NOTE — PROGRESS NOTE ADULT - ASSESSMENT
[facility]  Progress Note    Nobie Brittle Patient Status:  Outpatient in a Bed    1945 MRN [id number]   [facility] 6NE-A Attending Abby Wiley MD   Hosp Day # 0 PCP No primary care provider on file. Assessment:  1.   Bact Daily   • Metoprolol Succinate ER  75 mg Oral 2x Daily(Beta Blocker)   • ampicillin IVPB minibag/add-vantage 2g/100ml  2 g Intravenous Q8H   • Alfuzosin HCl ER  10 mg Oral Daily with breakfast   • cefTRIAXone  2 g Intravenous Q24H     • lactated ringers 10 60 YO male postop  shunt placement with acute neurologic deterioration

## 2021-01-25 NOTE — ED ADULT NURSE NOTE - NS ED NURSE LEVEL OF CONSCIOUSNESS MENTAL STATUS
Baker cyst appears to have ruptured at this point  She no longer has pain  There is some bruising noted, but not severe pain with it  If the bruising continues to be a problem, she can certainly follow up at this office  As far as going to Orthopedics, there really is no further treatment needed at this point  Awake/Alert

## 2021-02-22 NOTE — DISCHARGE NOTE ADULT - INSTRUCTIONS
Final Anesthesia Post-op Assessment    Patient: Janet Patel  Procedure(s) Performed: RIGHT EYE CATARACT EXTRACTION WITH INTRAOCULAR LENS IMPLANT - RIGHT  Anesthesia type: MAC    Vitals Value Taken Time   Temp 36.5 °C (97.7 °F) 02/22/21 0817   Pulse 88 02/22/21 0817   Resp 20 02/22/21 0817   SpO2 97 % 02/22/21 0817   /88 02/22/21 0817         Patient Location: Phase II  Post-op Vital Signs:stable  Level of Consciousness: awake and alert  Respiratory Status: spontaneous ventilation  Cardiovascular stable  Hydration: euvolemic  Pain Management: adequately controlled  Handoff: Handoff to receiving nurse was performed and questions were answered  Vomiting: none   Nausea: None  Airway Patency:patent  Post-op Assessment: no complications and patient tolerated procedure well with no complications      No complications documented.   
reg

## 2021-04-01 NOTE — PATIENT PROFILE ADULT. - EXTENSIONS OF SELF_ADULT
Cane/Eyeglasses Cane/None Tetracycline Counseling: Patient counseled regarding possible photosensitivity and increased risk for sunburn.  Patient instructed to avoid sunlight, if possible.  When exposed to sunlight, patients should wear protective clothing, sunglasses, and sunscreen.  The patient was instructed to call the office immediately if the following severe adverse effects occur:  hearing changes, easy bruising/bleeding, severe headache, or vision changes.  The patient verbalized understanding of the proper use and possible adverse effects of tetracycline.  All of the patient's questions and concerns were addressed. Patient understands to avoid pregnancy while on therapy due to potential birth defects.

## 2021-05-03 NOTE — ED PROVIDER NOTE - CROS ED ENMT ALL NEG
PHYSICAL THERAPY - DAILY TREATMENT NOTE    Patient Name: Juan Manuel Yadav        Date: 5/3/2021  : 1971    Patient  Verified: YES  Visit #:   2   of   12  Insurance: Payor: Geovany Delcid / Plan: Isidor Keep / Product Type: Managed Care Medicaid /      In time: 3:45 Out time: 4:40   Total Treatment Time: 55     Medicare Time Tracking (below)   Total Timed Codes (min):  - 1:1 Treatment Time:  -     TREATMENT AREA/ DIAGNOSIS = Neck pain [M54.2]  Back pain [M54.9]  Concussion [S06.0X9A]    SUBJECTIVE  Pain Level (on 0 to 10 scale):  7  / 10   Medication Changes/New allergies or changes in medical history, any new surgeries or procedures?     NO    If yes, update Summary List   Subjective Functional Status/Changes:  []  No changes reported     Pt reports pain with walking and standing prolonged periods       OBJECTIVE  Modalities Rationale:     decrease pain and increase tissue extensibility to improve patient's ability to perform ADLs without pain     min [] Estim, type/location:                                      []  att     []  unatt     []  w/US     []  w/ice    []  w/heat    min []  Mechanical Traction: type/lbs                   []  pro   []  sup   []  int   []  cont    []  before manual    []  after manual    min []  Ultrasound, settings/location:      min []  Iontophoresis w/ dexamethasone, location:                                               []  take home patch       []  in clinic   10 min []  Ice     [x]  Heat    location/position: L/S and C/S    min []  Vasopneumatic Device, press/temp:     min []  Other:    [x] Skin assessment post-treatment (if applicable):    [x]  intact    []  redness- no adverse reaction     []redness  adverse reaction:        15 min Therapeutic Exercise:  [x]  See flow sheet   Rationale:      increase ROM and increase strength to improve the patients ability to perform ADLs without pain       15 min Neuromuscular Re-ed: [x]  See flow sheet   Rationale: improve coordination and improve balance to improve the patients ability to perform ADLs without pain      15 min Self Care: Education on pathology, pain modulation, HEP   Rationale:    education to improve the patients ability to self manage symptoms     Billed With/As:   [x] TE   [] TA   [] Neuro   [] Self Care Patient Education: [x] Review HEP    [] Progressed/Changed HEP based on:   [] positioning   [] body mechanics   [] transfers   [] heat/ice application    [] other:        Other Objective/Functional Measures:    Pain had increased pain with UBE   Post Treatment Pain Level (on 0 to 10) scale:   7  / 10     ASSESSMENT  Assessment/Changes in Function:     Pt able to perform table exercises with minimal increase in symptoms      []  See Progress Note/Recertification   Patient will continue to benefit from skilled PT services to modify and progress therapeutic interventions, address functional mobility deficits, address ROM deficits, address strength deficits, analyze and address soft tissue restrictions and analyze and cue movement patterns to attain remaining goals.    Progress toward goals / Updated goals:    Good Progress to    [] STG    [x] LTG  1 as shown by improve activity tolerance after treatment session     PLAN  [x]  Upgrade activities as tolerated YES Continue plan of care   []  Discharge due to :    []  Other:      Therapist: Daryle Peach ,DPT     Date: 5/3/2021 Time: 2:08 PM        Future Appointments   Date Time Provider Trey An   5/3/2021  3:45 PM Wilya Durand [facility]   5/6/2021  3:45 PM Margy Delong PTA [facility]   5/10/2021  3:45 PM Wilya Durand [facility]   5/13/2021  3:45 PM Margy Delong PTA [facility]   5/17/2021  3:45 PM Cecillia Durand [facility]   5/20/2021  3:45 PM Alvnadege Delong, PTA [facility]   5/24/2021  3:45 PM Cecillia Durand [facility]   5/27/2021  4:30 PM Cindy Key PTA [facility] negative...

## 2021-09-23 NOTE — H&P ADULT. - NECK DETAILS
Carolr left voicemail for Edcindy to return call regarding returning his call.   Writer appologized for not calling back sooner, as writer was on PTO.   Edward can return call to  at [phone].  Awaiting call back from patient.      
Patient's father Jacobo calling with questions regarding the patient's appointment with Dr. Ramos for a consult.    Please call Jacobo back to discuss - [phone].  
Writer spoke to Clinton regarding Dr. Ramos is retiring in December and no longer seeing consults as of 9/20/21.  Writer thanked them for their time.  Verbalized understanding and no other questions or concerns at this time that need to be addressed.     
normal thyroid gland/supple/no JVD

## 2021-10-15 NOTE — OCCUPATIONAL THERAPY INITIAL EVALUATION ADULT - LUE MMT, REHAB EVAL
Grossly 3+/5 t/p Ear Wedge Repair Text: A wedge excision was completed by carrying down an excision through the full thickness of the ear and cartilage with an inward facing Burow's triangle. The wound was then closed in a layered fashion.

## 2022-11-06 NOTE — H&P ADULT - PROBLEM SELECTOR PLAN 3
no chest pain and no edema. OOB only with assist.  Monitor progress. PT eval once clinically indicated.

## 2023-03-22 NOTE — ED ADULT NURSE NOTE - ATTEMPT TO OOB
[facility] Division of Hospital Medicine  Ellyn Andre MD  Pager [phone]    Patient is a 78y old  Female who presents with a chief complaint of Fall       SUBJECTIVE / OVERNIGHT EVENTS: offers no complaints;  at bedside      MEDICATIONS  (STANDING):  albuterol/ipratropium for Nebulization 3 milliLiter(s) Nebulizer every 6 hours  aspirin enteric coated 81 milliGRAM(s) Oral daily  atorvastatin 40 milliGRAM(s) Oral at bedtime  budesonide  80 MICROgram(s)/formoterol 4.5 MICROgram(s) Inhaler 2 Puff(s) Inhalation two times a day  enoxaparin Injectable 40 milliGRAM(s) SubCutaneous every 24 hours  FLUoxetine 20 milliGRAM(s) Oral daily  gabapentin 300 milliGRAM(s) Oral at bedtime  influenza  Vaccine (HIGH DOSE) 0.7 milliLiter(s) IntraMuscular once  methadone    Tablet 10 milliGRAM(s) Oral every 12 hours  metoprolol succinate ER 25 milliGRAM(s) Oral daily  montelukast 10 milliGRAM(s) Oral daily  pantoprazole    Tablet 40 milliGRAM(s) Oral before breakfast  polyethylene glycol 3350 17 Gram(s) Oral daily  predniSONE   Tablet 40 milliGRAM(s) Oral daily  senna 2 Tablet(s) Oral at bedtime  tiotropium 2.5 MICROgram(s) Inhaler 2 Puff(s) Inhalation daily    MEDICATIONS  (PRN):  acetaminophen     Tablet .. 650 milliGRAM(s) Oral every 6 hours PRN Temp greater or equal to 38C (100.4F), Mild Pain (1 - 3)  aluminum hydroxide/magnesium hydroxide/simethicone Suspension 30 milliLiter(s) Oral every 4 hours PRN Dyspepsia  bisacodyl 5 milliGRAM(s) Oral every 12 hours PRN Constipation  melatonin 3 milliGRAM(s) Oral at bedtime PRN Insomnia  ondansetron Injectable 4 milliGRAM(s) IV Push every 8 hours PRN Nausea and/or Vomiting  oxyCODONE    IR 10 milliGRAM(s) Oral every 6 hours PRN moderate and severe pain      PHYSICAL EXAM:  Vital Signs Last 24 Hrs  T(F): 98.5 (22 Mar 2023 13:03), Max: 98.6 (22 Mar 2023 05:17)  HR: 90 (22 Mar 2023 13:03) (65 - 92)  BP: 103/56 (22 Mar 2023 13:03) (103/56 - 134/61)  RR: 19 (22 Mar 2023 13:03) (17 - 19)  SpO2: 95% (22 Mar 2023 13:03) (95% - 98%)    Parameters below as of 22 Mar 2023 13:03  Patient On (Oxygen Delivery Method): room air        CONSTITUTIONAL: NAD, appears comfortable  EYES: PERRLA; conjunctiva and sclera clear  ENMT: Moist oral mucosa; normal dentition  RESPIRATORY: Normal respiratory effort; lungs are clear to auscultation bilaterally  CARDIOVASCULAR: Regular rate and rhythm; No lower extremity edema;  ABDOMEN: Nontender to palpation, normoactive bowel sounds  MUSCULOSKELETAL:  no clubbing or cyanosis of digits; no joint swelling or tenderness to palpation  PSYCH: A+O to person, place, and time; affect appropriate  NEUROLOGY: CN 2-12 are intact and symmetric; no gross sensory deficits   SKIN: No rashes; no palpable lesions    LABS:                        11.9   9.72  )-----------( 187      ( 22 Mar 2023 05:30 )             37.0     03-22    140  |  101  |  10  ----------------------------<  87  4.4   |  28  |  0.57    Ca    9.2      22 Mar 2023 05:30  Phos  3.6     03-22  Mg     2.30     03-22           [facility] Division of Hospital Medicine  Ellyn Andre MD  Pager [phone]    Patient is a 78y old  Female who presents with a chief complaint of Fall        SUBJECTIVE / OVERNIGHT EVENTS: reports a burning pain in LLE earlier this AM which has since resolved;  at bedside; await PT eval for dc planning      MEDICATIONS  (STANDING):  albuterol/ipratropium for Nebulization 3 milliLiter(s) Nebulizer every 6 hours  aspirin enteric coated 81 milliGRAM(s) Oral daily  atorvastatin 40 milliGRAM(s) Oral at bedtime  budesonide  80 MICROgram(s)/formoterol 4.5 MICROgram(s) Inhaler 2 Puff(s) Inhalation two times a day  enoxaparin Injectable 40 milliGRAM(s) SubCutaneous every 24 hours  FLUoxetine 20 milliGRAM(s) Oral daily  gabapentin 300 milliGRAM(s) Oral at bedtime  influenza  Vaccine (HIGH DOSE) 0.7 milliLiter(s) IntraMuscular once  methadone    Tablet 10 milliGRAM(s) Oral every 12 hours  metoprolol succinate ER 25 milliGRAM(s) Oral daily  montelukast 10 milliGRAM(s) Oral daily  pantoprazole    Tablet 40 milliGRAM(s) Oral before breakfast  predniSONE   Tablet 40 milliGRAM(s) Oral daily  senna 2 Tablet(s) Oral at bedtime  tiotropium 2.5 MICROgram(s) Inhaler 2 Puff(s) Inhalation daily    MEDICATIONS  (PRN):  acetaminophen     Tablet .. 650 milliGRAM(s) Oral every 6 hours PRN Temp greater or equal to 38C (100.4F), Mild Pain (1 - 3)  aluminum hydroxide/magnesium hydroxide/simethicone Suspension 30 milliLiter(s) Oral every 4 hours PRN Dyspepsia  melatonin 3 milliGRAM(s) Oral at bedtime PRN Insomnia  ondansetron Injectable 4 milliGRAM(s) IV Push every 8 hours PRN Nausea and/or Vomiting  oxyCODONE    IR 5 milliGRAM(s) Oral every 6 hours PRN Moderate Pain (4 - 6)  oxyCODONE    IR 10 milliGRAM(s) Oral every 6 hours PRN Severe Pain (7 - 10)      CAPILLARY BLOOD GLUCOSE  POCT Blood Glucose.: 188 mg/dL (20 Mar 2023 21:21)        PHYSICAL EXAM:  Vital Signs Last 24 Hrs  T(F): 97.9 (21 Mar 2023 08:00), Max: 98.5 (20 Mar 2023 15:15)  HR: 72 (21 Mar 2023 09:05) (67 - 78)  BP: 141/47 (21 Mar 2023 08:00) (118/50 - 143/63)  RR: 17 (21 Mar 2023 08:00) (15 - 18)  SpO2: 97% (21 Mar 2023 09:05) (95% - 100%)    Parameters below as of 21 Mar 2023 09:05  Patient On (Oxygen Delivery Method): room air        CONSTITUTIONAL: NAD, appears comfortable  EYES: PERRLA; conjunctiva and sclera clear  ENMT: Moist oral mucosa; normal dentition  RESPIRATORY: Normal respiratory effort; grossly b/l AE  CARDIOVASCULAR: Regular rate and rhythm; No lower extremity edema;   ABDOMEN: Nontender to palpation, normoactive bowel sounds  MUSCULOSKELETAL:  no clubbing or cyanosis of digits; no joint swelling or tenderness to palpation  PSYCH: A+O to person, place, and time; affect appropriate  NEUROLOGY: CN 2-12 are intact and symmetric; no gross sensory deficits   SKIN: LLE immobilizer in place    LABS:                        12.2   8.79  )-----------( 197      ( 21 Mar 2023 06:30 )             37.6     03-21    138  |  99  |  8   ----------------------------<  98  4.1   |  28  |  0.50    Ca    9.0      21 Mar 2023 06:30  Phos  3.6     03-21  Mg     2.30     03-21       [facility] Division of Hospital Medicine  Ellyn Andre MD  Pager [phone]    Patient is a 78y old  Female who presents with a chief complaint of Fall       SUBJECTIVE / OVERNIGHT EVENTS: reports pain L knee, some cough, chronic      MEDICATIONS  (STANDING):  albuterol/ipratropium for Nebulization 3 milliLiter(s) Nebulizer every 6 hours  aspirin enteric coated 81 milliGRAM(s) Oral daily  atorvastatin 40 milliGRAM(s) Oral at bedtime  budesonide  80 MICROgram(s)/formoterol 4.5 MICROgram(s) Inhaler 2 Puff(s) Inhalation two times a day  enoxaparin Injectable 40 milliGRAM(s) SubCutaneous every 24 hours  FLUoxetine 20 milliGRAM(s) Oral daily  gabapentin 300 milliGRAM(s) Oral at bedtime  influenza  Vaccine (HIGH DOSE) 0.7 milliLiter(s) IntraMuscular once  methadone    Tablet 10 milliGRAM(s) Oral every 12 hours  metoprolol succinate ER 25 milliGRAM(s) Oral daily  montelukast 10 milliGRAM(s) Oral daily  pantoprazole    Tablet 40 milliGRAM(s) Oral before breakfast  predniSONE   Tablet 40 milliGRAM(s) Oral daily  senna 2 Tablet(s) Oral at bedtime  tiotropium 2.5 MICROgram(s) Inhaler 2 Puff(s) Inhalation daily    MEDICATIONS  (PRN):  acetaminophen     Tablet .. 650 milliGRAM(s) Oral every 6 hours PRN Temp greater or equal to 38C (100.4F), Mild Pain (1 - 3)  aluminum hydroxide/magnesium hydroxide/simethicone Suspension 30 milliLiter(s) Oral every 4 hours PRN Dyspepsia  melatonin 3 milliGRAM(s) Oral at bedtime PRN Insomnia  ondansetron Injectable 4 milliGRAM(s) IV Push every 8 hours PRN Nausea and/or Vomiting  oxyCODONE    IR 5 milliGRAM(s) Oral every 6 hours PRN Moderate Pain (4 - 6)  oxyCODONE    IR 10 milliGRAM(s) Oral every 6 hours PRN Severe Pain (7 - 10)      PHYSICAL EXAM:  Vital Signs Last 24 Hrs  T(F): 98.5 (20 Mar 2023 11:00), Max: 98.5 (20 Mar 2023 11:00)  HR: 67 (20 Mar 2023 11:00) (62 - 80)  BP: 114/45 (20 Mar 2023 11:00) (100/49 - 142/73)  RR: 18 (20 Mar 2023 11:00) (15 - 19)  SpO2: 100% (20 Mar 2023 11:00) (96% - 100%)    Parameters below as of 20 Mar 2023 11:00  Patient On (Oxygen Delivery Method): room air        CONSTITUTIONAL: NAD, appears comfortable  EYES: PERRLA; conjunctiva and sclera clear  ENMT: Moist oral mucosa; normal dentition  RESPIRATORY: Normal respiratory effort; grossly b/l AE with rhonchi, improved with cough  CARDIOVASCULAR: Regular rate and rhythm; No lower extremity edema;   ABDOMEN: Nontender to palpation, normoactive bowel sounds  MUSCULOSKELETAL: no clubbing or cyanosis of digits; no joint swelling or tenderness to palpation  PSYCH: A+O to person, place, and time; affect appropriate  NEUROLOGY: CN 2-12 are intact and symmetric; no gross sensory deficits   SKIN: No rashes; no palpable lesions    LABS:                        12.8   8.11  )-----------( 204      ( 20 Mar 2023 12:35 )             40.1     03-20    138  |  99  |  9   ----------------------------<  168<H>  5.1   |  29  |  0.49<L>    Ca    9.5      20 Mar 2023 12:35    TPro  6.5  /  Alb  4.0  /  TBili  0.6  /  DBili  x   /  AST  21  /  ALT  14  /  AlkPhos  87  03-20    PT/INR - ( 20 Mar 2023 12:35 )   PT: 11.7 sec;   INR: 1.01 ratio         PTT - ( 20 Mar 2023 12:35 )  PTT:30.3 sec       no

## 2023-07-13 NOTE — ED PROVIDER NOTE - CROS ED NEURO ALL NEG
Certified Ostomy Nurse Visit (30 minutes)  See Epic Ostomy Flowsheet for site assessment. Reason for visit:  Routine pouch change    Assessment:    Additional assessment notes: Gave patient one additional pouch and overnight drainage bag to go home. Peristomal skin: intact    Stoma: red, viable    Effluent: yellow urine    Appliance evaluation: coming off abdomen    Interventions:   Appliance changed. Ostomy RN Recommendations:     Peristomal skin care: Wash with warm water only; dry thoroughly. Appliance: Convatec two piece moldable 2 1/4. Frequency of change: Every 4-7 days and as needed for leakage. - - -

## 2025-05-07 NOTE — DISCHARGE NOTE ADULT - BECAUSE OF A PHYSICAL, MENTAL OR EMOTIONAL CONDITION, DO YOU HAVE DIFFICULTY DOING  ERRANDS ALONE LIKE VISITING A DOCTOR'S OFFICE OR SHOPPING (15 YEARS AND OLDER)
Pt. is a candidate for BASIC or CV MONO [facility] Dom/mon[place]. [Joint Pain] : joint pain [Negative] : Heme/Lymph No

## 2025-06-23 NOTE — DISCHARGE NOTE ADULT - DO YOU HAVE DIFFICULTY CLIMBING STAIRS
Medication failed protocol.    Medication: tirzepatide (Mounjaro) 15 MG/0.5ML Solution Auto-injector   Medication Refill Protocol Failed.  Medication Refill Protocol Failed. Courtesy Refill provided after lipid panel order placed per protocol guidelines. Writer confirmed, courtesy refill was not a duplicate.  Last office visit date: 5/13/2025  Next appointment scheduled?: No; patient currently has a callback request in with Dr. Saldivar regarding 6/17/25 lab results.      
No